# Patient Record
Sex: FEMALE | Race: OTHER | NOT HISPANIC OR LATINO
[De-identification: names, ages, dates, MRNs, and addresses within clinical notes are randomized per-mention and may not be internally consistent; named-entity substitution may affect disease eponyms.]

---

## 2019-05-16 PROBLEM — Z00.00 ENCOUNTER FOR PREVENTIVE HEALTH EXAMINATION: Status: ACTIVE | Noted: 2019-05-16

## 2019-05-30 ENCOUNTER — APPOINTMENT (OUTPATIENT)
Dept: ORTHOPEDIC SURGERY | Facility: CLINIC | Age: 49
End: 2019-05-30
Payer: MEDICAID

## 2019-05-30 ENCOUNTER — OUTPATIENT (OUTPATIENT)
Dept: OUTPATIENT SERVICES | Facility: HOSPITAL | Age: 49
LOS: 1 days | End: 2019-05-30
Payer: COMMERCIAL

## 2019-05-30 DIAGNOSIS — G56.22 LESION OF ULNAR NERVE, LEFT UPPER LIMB: ICD-10-CM

## 2019-05-30 DIAGNOSIS — G56.21 LESION OF ULNAR NERVE, RIGHT UPPER LIMB: ICD-10-CM

## 2019-05-30 PROCEDURE — 99203 OFFICE O/P NEW LOW 30 MIN: CPT

## 2019-05-30 PROCEDURE — 73130 X-RAY EXAM OF HAND: CPT | Mod: 26,50

## 2019-05-30 PROCEDURE — 73130 X-RAY EXAM OF HAND: CPT

## 2019-05-30 NOTE — PHYSICAL EXAM
[de-identified] : GEN: NAD, AOx3\par RUE: 5/5 FDI, , thumb opposition, palmar intrinsics, + tinels over carpal tunnel and cubital tunnel. Subluxing ulnar nerve at elbow. SILT throughout. + 2 radial pulse\par LUE: 5/5 FDI, thumb opposition, , intrinsics, + tinels over carpal tunnel and cubital tunnel.  No ulnar nerve subluxation. SILT throughout. + 2 radial pulse

## 2019-05-30 NOTE — HISTORY OF PRESENT ILLNESS
[FreeTextEntry1] : 48F with hx BL hand pain and numbness which has been waking her up at night and has been getting worse over the past few weeks. Exacerbated by typing and folding clothes. Denies weakness.

## 2019-06-10 ENCOUNTER — FORM ENCOUNTER (OUTPATIENT)
Age: 49
End: 2019-06-10

## 2019-06-11 ENCOUNTER — APPOINTMENT (OUTPATIENT)
Dept: ORTHOPEDIC SURGERY | Facility: CLINIC | Age: 49
End: 2019-06-11
Payer: MEDICAID

## 2019-06-11 ENCOUNTER — OUTPATIENT (OUTPATIENT)
Dept: OUTPATIENT SERVICES | Facility: HOSPITAL | Age: 49
LOS: 1 days | End: 2019-06-11
Payer: COMMERCIAL

## 2019-06-11 VITALS
OXYGEN SATURATION: 98 % | SYSTOLIC BLOOD PRESSURE: 120 MMHG | WEIGHT: 178 LBS | HEART RATE: 83 BPM | BODY MASS INDEX: 33.61 KG/M2 | DIASTOLIC BLOOD PRESSURE: 78 MMHG | HEIGHT: 61 IN

## 2019-06-11 DIAGNOSIS — Z78.9 OTHER SPECIFIED HEALTH STATUS: ICD-10-CM

## 2019-06-11 DIAGNOSIS — F41.9 ANXIETY DISORDER, UNSPECIFIED: ICD-10-CM

## 2019-06-11 DIAGNOSIS — F32.9 MAJOR DEPRESSIVE DISORDER, SINGLE EPISODE, UNSPECIFIED: ICD-10-CM

## 2019-06-11 DIAGNOSIS — Z82.61 FAMILY HISTORY OF ARTHRITIS: ICD-10-CM

## 2019-06-11 DIAGNOSIS — Z80.3 FAMILY HISTORY OF MALIGNANT NEOPLASM OF BREAST: ICD-10-CM

## 2019-06-11 PROCEDURE — 72083 X-RAY EXAM ENTIRE SPI 4/5 VW: CPT | Mod: 26

## 2019-06-11 PROCEDURE — 72100 X-RAY EXAM L-S SPINE 2/3 VWS: CPT

## 2019-06-11 PROCEDURE — 99215 OFFICE O/P EST HI 40 MIN: CPT

## 2019-06-11 PROCEDURE — 72082 X-RAY EXAM ENTIRE SPI 2/3 VW: CPT

## 2019-06-17 PROBLEM — Z82.61 FAMILY HISTORY OF ARTHRITIS: Status: ACTIVE | Noted: 2019-06-11

## 2019-06-17 PROBLEM — Z78.9 NEVER EXERCISES: Status: ACTIVE | Noted: 2019-06-11

## 2019-06-17 PROBLEM — F32.9 DEPRESSION: Status: ACTIVE | Noted: 2019-06-11

## 2019-06-17 PROBLEM — F41.9 ANXIETY: Status: ACTIVE | Noted: 2019-06-11

## 2019-06-17 PROBLEM — Z80.3 FAMILY HISTORY OF MALIGNANT NEOPLASM OF BREAST: Status: ACTIVE | Noted: 2019-06-11

## 2019-06-17 RX ORDER — FLUVOXAMINE MALEATE 100 MG/1
100 TABLET, FILM COATED ORAL
Refills: 0 | Status: ACTIVE | COMMUNITY

## 2019-06-30 ENCOUNTER — FORM ENCOUNTER (OUTPATIENT)
Age: 49
End: 2019-06-30

## 2019-06-30 NOTE — HISTORY OF PRESENT ILLNESS
[de-identified] : Ms. CARVAJAL is a very pleasant 48 year old female who complains of low back pain for 1 year, atraumatic. On initial presentation symptoms were as follows: Patient described the pain as intermittent.  Patient rated the pain as 6-9/10 severity.  The pain localized to lower back.  There is no radiation of pain. She does also report pain in both calves over the same time period. Has had a negative vascular work up per patient for calf pain. Has calf pain at rest and with ambulation. Patient  denies extremity numbness and paresthesias. The pain is relieved by lying down.  The pain is worsened by activity, heavy lifting, or prolonged standing. Past treatments included pharmacologic management, with mild temporary relief. The patient has not had physical therapy or steroid injections.\par \par The patient reports no loss of hand dexterity.\par The patient states there no loss of balance when walking.\par There no sensory loss in the arms or legs\par The patent no difficulty with urination.\par \par The patient no history of previous spine surgery.\par The patient no previous spine consultation.\par \par Pain:\par Back 50 Right Leg 25  Left Leg 25\par \par The patient has no history of unexpected weight loss, no history of active cancer, no history bladder or bowel dysfunction, no night pain, no fevers or chills.\par \par The past medical history, surgical history, family history, allergies, medications, 10+ point review of systems, family history and social history were reviewed and non contributory.\par \par

## 2019-06-30 NOTE — PHYSICAL EXAM
[de-identified] : General: patient is well developed, well nourished, in no acute \par distress, alert and oriented x 3. \par \par Mood and affect: normal\par \par Respiratory: no respiratory distress noted\par \par Skin: no scars over spine, skin intact, no erythema, increased warmth\par \par Alignment:The spine is well compensated in the coronal and sagittal plane.  There is no asymmetry on the luis forward bend test\par \par Gait: The patient is able to toe walk and heel walk without difficulty. The patient is able to tandem gait without difficulty.\par \par Palpation: no tenderness to palpation spine or paraspinal region\par \par Range of motion: Lumbar spine ROM is full\par \par Neurologic Exam:\par Motor: Manual Muscle testing in the lower extremities is 5 out of 5 in all muscle groups. There is no evidence of muscular atrophy in the lower extremities. Sensory: Sensation to light touch is grossly intact in the lower extremities\par \par Reflexes: DTR are present and symmetric throughout, negative tijerina bilaterally, negative inverted radial reflex bilaterally, no clonus, plantar responses are flexor\par \par Hip Exam: No pain with internal or external rotation of hips bilaterally\par \par Special tests: Straight leg raise test negative.  Cross straight leg test negative.  BLAINE test negative\par \par Vascular: Examination of the peripheral vascular system demonstrates no evidence of congestion or edema. no evidence of lymphedema bilateral lower extremities, pulses are present and symmetric in both lower extremities.\par \par  [de-identified] : XR thoracolumbar 6/11/19: multilevel degenerative changes, most pronounced at L2/3 and L3/4, no significant listhesis or dynamic instability, no significant scoliosis, no acute fractures

## 2019-06-30 NOTE — DISCUSSION/SUMMARY
[de-identified] : Discussed the results of the patient's history, physical exam, and imaging. Ms. Cui has been suffering from non radiating low back pain for the past year. She also reports bilateral calf pain over the same time period, no change with rest. She has had a negative vascular workup from an outside provider per the patient. Physical and neurologic exams are normal. I am recommending an MRI lumbar without contrast for further evaluation. The patient will follow up with me after imaging is completed, sooner if there is an issue.  All questions were answered.\par \par

## 2019-07-01 ENCOUNTER — APPOINTMENT (OUTPATIENT)
Dept: MRI IMAGING | Facility: HOSPITAL | Age: 49
End: 2019-07-01
Payer: MEDICAID

## 2019-07-01 ENCOUNTER — OUTPATIENT (OUTPATIENT)
Dept: OUTPATIENT SERVICES | Facility: HOSPITAL | Age: 49
LOS: 1 days | End: 2019-07-01
Payer: COMMERCIAL

## 2019-07-01 PROCEDURE — 72148 MRI LUMBAR SPINE W/O DYE: CPT | Mod: 26

## 2019-07-01 PROCEDURE — 72148 MRI LUMBAR SPINE W/O DYE: CPT

## 2019-07-10 ENCOUNTER — APPOINTMENT (OUTPATIENT)
Dept: ORTHOPEDIC SURGERY | Facility: CLINIC | Age: 49
End: 2019-07-10
Payer: MEDICAID

## 2019-07-10 PROCEDURE — 99215 OFFICE O/P EST HI 40 MIN: CPT

## 2019-07-10 NOTE — HISTORY OF PRESENT ILLNESS
[de-identified] : Follow Up 7/10/19: Patient continues to have moderate/severe non radiating low back pain. Also complains of bilateral calf pain. Low back pain more severe than leg pain. Symptoms unchanged. Denies new neurologic symptoms, denies bowel/bladder symptoms. Here to discuss MRI lumbar.\par \par Initial 6/11/19: Ms. CARVAJAL is a very pleasant 48 year old female who complains of low back pain for 1 year, atraumatic. On initial presentation symptoms were as follows: Patient described the pain as intermittent. Patient rated the pain as 6-9/10 severity. The pain localized to lower back. There is no radiation of pain. She does also report pain in both calves over the same time period. Has had a negative vascular work up per patient for calf pain. Has calf pain at rest and with ambulation. Patient denies extremity numbness and paresthesias. The pain is relieved by lying down. The pain is worsened by activity, heavy lifting, or prolonged standing. Past treatments included pharmacologic management, with mild temporary relief. The patient has not had physical therapy or steroid injections.\par \par The patient reports no loss of hand dexterity.\par The patient states there no loss of balance when walking.\par There no sensory loss in the arms or legs\par The patent no difficulty with urination.\par \par The patient no history of previous spine surgery.\par The patient no previous spine consultation.\par \par Pain:\par Back 50 Right Leg 25 Left Leg 25\par \par The patient has no history of unexpected weight loss, no history of active cancer, no history bladder or bowel dysfunction, no night pain, no fevers or chills.\par \par The past medical history, surgical history, family history, allergies, medications, 10+ point review of systems, family history and social history were reviewed and non contributory.

## 2019-07-10 NOTE — PHYSICAL EXAM
[de-identified] : General: patient is well developed, well nourished, in no acute \par distress, alert and oriented x 3. \par \par Mood and affect: normal\par \par Respiratory: no respiratory distress noted\par \par Skin: no scars over spine, skin intact, no erythema, increased warmth\par \par Alignment:The spine is well compensated in the coronal and sagittal plane. There is no asymmetry on the luis forward bend test\par \par Gait: The patient is able to toe walk and heel walk without difficulty. The patient is able to tandem gait without difficulty.\par \par Palpation: no tenderness to palpation spine or paraspinal region\par \par Range of motion: Lumbar spine ROM is full\par \par Neurologic Exam:\par Motor: Manual Muscle testing in the lower extremities is 5 out of 5 in all muscle groups. There is no evidence of muscular atrophy in the lower extremities. Sensory: Sensation to light touch is grossly intact in the lower extremities\par \par Reflexes: DTR are present and symmetric throughout, negative tijerina bilaterally, negative inverted radial reflex bilaterally, no clonus, plantar responses are flexor\par \par Hip Exam: No pain with internal or external rotation of hips bilaterally\par \par Special tests: Straight leg raise test negative. Cross straight leg test negative. BLIANE test negative\par \par Vascular: Examination of the peripheral vascular system demonstrates no evidence of congestion or edema. no evidence of lymphedema bilateral lower extremities, pulses are present and symmetric in both lower extremities.\par  [de-identified] : MRI lumbar 2019: L2/3 and L3/4 disc degeneration with minimum disc bulges; no significant stenosis\par \par XR thoracolumbar 6/11/19: multilevel degenerative changes, most pronounced at L2/3 and L3/4, no significant listhesis or dynamic instability, no significant scoliosis, no acute fractures.

## 2019-07-10 NOTE — DISCUSSION/SUMMARY
[de-identified] : Discussed the results of the patient's history, physical exam, and imaging. Ms. Cui has been suffering from non radiating low back pain for the past year. She also reports bilateral calf pain over the same time period, no change with rest. She has had a negative vascular workup from an outside provider per the patient. Physical and neurologic exams are normal. There is no indication for surgical intervention at this time. I am recommending a course of physical therapy, order given. I have also recommended a nonsteroidal anti-inflammatory, diclofenac, to be taken daily for 2 weeks.  If this is helpful I have instructed the patient to take it for an additional 2 weeks and then PRN afterwards.  A prescription for diclofenac was given.Patient to call office after course of physical therapy, sooner if there is an issue.  If no improvement in bilateral calf pain, will send for neurology evaluation. All questions answered.\par \par

## 2019-07-22 ENCOUNTER — RX RENEWAL (OUTPATIENT)
Age: 49
End: 2019-07-22

## 2019-08-21 ENCOUNTER — APPOINTMENT (OUTPATIENT)
Dept: NEUROLOGY | Facility: CLINIC | Age: 49
End: 2019-08-21
Payer: MEDICAID

## 2019-08-21 VITALS
HEIGHT: 61 IN | HEART RATE: 76 BPM | BODY MASS INDEX: 33.61 KG/M2 | SYSTOLIC BLOOD PRESSURE: 114 MMHG | TEMPERATURE: 98.4 F | WEIGHT: 178 LBS | OXYGEN SATURATION: 96 % | DIASTOLIC BLOOD PRESSURE: 76 MMHG

## 2019-08-21 PROCEDURE — 76882 US LMTD JT/FCL EVL NVASC XTR: CPT | Mod: LT

## 2019-08-21 PROCEDURE — 95885 MUSC TST DONE W/NERV TST LIM: CPT | Mod: 59

## 2019-08-21 PROCEDURE — 95913 NRV CNDJ TEST 13/> STUDIES: CPT

## 2019-08-21 PROCEDURE — 99204 OFFICE O/P NEW MOD 45 MIN: CPT

## 2019-08-21 NOTE — HISTORY OF PRESENT ILLNESS
[FreeTextEntry1] : CC: hand pain (new) \par \par Other Problem(s): low back pain; tingling, numbness in hands (new)\par \par HPI: 47 yo W referred by Dr. Castro for hand pain, numbness, tingling\par \par Location: hands and wrists, bilateral \par Quality: tingling, sharp \par Severity: 5-6/10\par Duration: 1 year\par Timing: worse at night\par Modifying Factors: better with brace \par Associated signs and symptoms: numbness and tingling in hands\par \par Data reviewed:\par Labs: none\par Imaging (reports): MRI L spine, x-ray T and L spine \par Imaging (independently reviewed): MRI L spine \par Prior records: notes from Dr. Segura and Dr. Castro - low back pain, surgery not recommended; likely carpal tunnel syndrome \par \par ROS: 13 pt review of systems performed and reviewed with patient (General, Eyes, Ears, Cardiovascular, Respiratory, Gastrointestinal, Genitourinary, Musculoskeletal, Skin, Endocrine, Hematologic, Psychiatric, Neurologic)\par Past medical history, surgical history, social history, and family history reviewed with patient\par See scanned document for details\par

## 2019-08-21 NOTE — CONSULT LETTER
[Dear  ___] : Dear  [unfilled], [Consult Letter:] : I had the pleasure of evaluating your patient, [unfilled]. [Consult Closing:] : Thank you very much for allowing me to participate in the care of this patient.  If you have any questions, please do not hesitate to contact me. [Please see my note below.] : Please see my note below. [Sincerely,] : Sincerely, [FreeTextEntry3] : Ian Rascon M.D.\par Neurology, Electromyography and Neuromuscular Medicine\par [facility]\par \par  of Neurology\par [facility] [employer]

## 2019-08-21 NOTE — PROCEDURE
[FreeTextEntry1] : Nerve Conduction, Electromyography and Neuromuscular Ultrasound Report [FreeTextEntry3] : Electro Physiologic Findings:\par \par Limb temperature was monitored and maintained at approximately 32 – 36° C in the upper extremities.\par \par The median sensory amplitudes were reduced bilaterally; the sensory and mixed nerve velocities were slow across the wrists compared to the distal sensory short segments bilaterally, worse on the right. The right median distal motor latency was prolonged, without conduction block across the wrist; the left median motor velocity was mildly reduced in the forearm. Otherwise the median motor responses were normal bilaterally and symmetric. \par \par The ulnar sensory responses were normal bilaterally and symmetric. There was a suggestion of mild slowing of ulnar motor responses across the elbows bilaterally, without conduction block; otherwise the sensory motor responses were normal and symmetric. \par \par The lumbrical studies were positive bilaterally, worse on the right. \par \par Needle electromyography was performed on the bilateral abductor pollicis brevis muscles. There was moderate active denervation on the right, and mild chronic denervation on the left. \par \par Neuromuscular Ultrasound was performed on the bilateral median nerves, using an [employer] Gamma with a linear high-frequency (12-19Hz) transducer probe. \par \par Clinical Electrophysiological Impression: \par \par This electrodiagnostic study demonstrated evidence of median nerve entrapments at both wrists. The entrapment was moderate in severity, with sensorimotor slowing, sensory axon loss, and active distal muscle denervation. The entrapment on the left was mild, with sensory slowing, sensory axon loss, and mild chronic muscle denervation. The median nerves were focally enlarged at the wrists on ultrasound bilaterally. These findings, along with the patient’s history and exam, are consistent with bilateral carpal tunnel syndrome, worse on the right. \par \par There was no definite evidence of ulnar entrapment or polyneuropathy on this upper extremity study.

## 2019-08-21 NOTE — PHYSICAL EXAM
[FreeTextEntry1] : Gen: appears well, well-nourished, no acute distress\par \par MS: awake, alert, speech fluent, comprehension intact, follows commands\par \par CN: PERRL, EOMI, visual fields full, facial strength and sensation intact and symmetric, palate elevation symmetric, tongue midline, no tongue atrophy or fasciculations\par \par Motor: normal bulk and tone, APB 4+ b/l otherwise 5/5 strength throughout, no abnormal movements\par \par Sensory: light touch intact and symmetric throughout\par \par Reflexes: 2+ symmetric throughout, no Harris’s sign, plantar responses flexor bilaterally\par \par Coordination: no dysmetria on finger to nose, Romberg negative\par \par Gait: normal, can tandem without difficulty\par \par Skin: no rash on extremities\par \par Ext: no edema\par \par CV: 2+ pulses b/l\par \par Ophtho: fundi not visualized

## 2019-08-21 NOTE — ASSESSMENT
[FreeTextEntry1] : NCS/EMG performed today demonstrated b/l median nerve entrapments worse on the right\par f/u with Dr. Castro for carpal tunnel injection vs. release\par back pain f/u Dr. Segura \par \par See separate procedure note for full results of study.

## 2019-08-21 NOTE — CONSULT LETTER
[Dear  ___] : Dear  [unfilled], [Consult Letter:] : I had the pleasure of evaluating your patient, [unfilled]. [Consult Closing:] : Thank you very much for allowing me to participate in the care of this patient.  If you have any questions, please do not hesitate to contact me. [Please see my note below.] : Please see my note below. [Sincerely,] : Sincerely, [FreeTextEntry3] : Ian Rascon M.D.\par Neurology, Electromyography and Neuromuscular Medicine\par [facility]\par \par  of Neurology\par [facility] [employer]

## 2019-08-21 NOTE — PROCEDURE
[FreeTextEntry1] : Nerve Conduction, Electromyography and Neuromuscular Ultrasound Report [FreeTextEntry3] : Electro Physiologic Findings:\par \par Limb temperature was monitored and maintained at approximately 32 – 36° C in the upper extremities.\par \par The median sensory amplitudes were reduced bilaterally; the sensory and mixed nerve velocities were slow across the wrists compared to the distal sensory short segments bilaterally, worse on the right. The right median distal motor latency was prolonged, without conduction block across the wrist; the left median motor velocity was mildly reduced in the forearm. Otherwise the median motor responses were normal bilaterally and symmetric. \par \par The ulnar sensory responses were normal bilaterally and symmetric. There was a suggestion of mild slowing of ulnar motor responses across the elbows bilaterally, without conduction block; otherwise the sensory motor responses were normal and symmetric. \par \par The lumbrical studies were positive bilaterally, worse on the right. \par \par Needle electromyography was performed on the bilateral abductor pollicis brevis muscles. There was moderate active denervation on the right, and mild chronic denervation on the left. \par \par Neuromuscular Ultrasound was performed on the bilateral median nerves, using an [employer] Gamma with a linear high-frequency (12-19Hz) transducer probe. \par \par Clinical Electrophysiological Impression: \par \par This electrodiagnostic study demonstrated evidence of median nerve entrapments at both wrists. The entrapment was moderate in severity, with sensorimotor slowing, sensory axon loss, and active distal muscle denervation. The entrapment on the left was mild, with sensory slowing, sensory axon loss, and mild chronic muscle denervation. The median nerves were focally enlarged at the wrists on ultrasound bilaterally. These findings, along with the patient’s history and exam, are consistent with bilateral carpal tunnel syndrome, worse on the right. \par \par There was no definite evidence of ulnar entrapment or polyneuropathy on this upper extremity study.

## 2019-09-05 ENCOUNTER — APPOINTMENT (OUTPATIENT)
Dept: ORTHOPEDIC SURGERY | Facility: CLINIC | Age: 49
End: 2019-09-05
Payer: MEDICAID

## 2019-09-05 PROCEDURE — 99213 OFFICE O/P EST LOW 20 MIN: CPT

## 2019-09-05 NOTE — HISTORY OF PRESENT ILLNESS
[de-identified] : 49 F presenting for follow up visit who has B/L CTS R>L. last visit pt told to wear splints and get an out patient EMG which she did. Results showed b/l median nerve entrapment R>L. pt states she continues to have pain and it has progressed from last visit. reports now she has been dropping items from her right hand. Numbness and tingling continues to be intermittent. Otherwise interval history is the same.

## 2019-09-05 NOTE — PHYSICAL EXAM
[de-identified] : Physical Exam: \par general: well appearing female\par RUE: \par no swelling or effusion. no erythema. no wounds, lacerations or abrasions. \par no TTP over palm. \par + tinnels and phalen's tests\par thenar atrophy noted when compared to Left side\par hand  5/5\par SILT over m/u/r nerves\par 2+ radial pulse\par \par LUE: \par no swelling or effusion. no erythema. no wounds, lacerations or abrasions. \par no TTP over palm. \par + tinnels and phalen's tests\par no thenar atrophy noted\par hand  5/5\par SILT over m/u/r nerves\par 2+ radial pulse

## 2019-09-05 NOTE — ASSESSMENT
[FreeTextEntry1] : 49F w b/l carpal tunnel R>L discussed surgery today. Pt will be schedule for a R open carpal tunnel release at a date of her choosing.

## 2019-10-23 ENCOUNTER — APPOINTMENT (OUTPATIENT)
Dept: ORTHOPEDIC SURGERY | Facility: CLINIC | Age: 49
End: 2019-10-23
Payer: MEDICAID

## 2019-10-23 DIAGNOSIS — M54.5 LOW BACK PAIN: ICD-10-CM

## 2019-10-23 DIAGNOSIS — G89.29 LOW BACK PAIN: ICD-10-CM

## 2019-10-23 PROCEDURE — 99213 OFFICE O/P EST LOW 20 MIN: CPT

## 2019-10-29 ENCOUNTER — APPOINTMENT (OUTPATIENT)
Dept: ORTHOPEDIC SURGERY | Facility: CLINIC | Age: 49
End: 2019-10-29

## 2019-11-03 ENCOUNTER — FORM ENCOUNTER (OUTPATIENT)
Age: 49
End: 2019-11-03

## 2019-11-04 ENCOUNTER — APPOINTMENT (OUTPATIENT)
Dept: ORTHOPEDIC SURGERY | Facility: CLINIC | Age: 49
End: 2019-11-04
Payer: MEDICAID

## 2019-11-04 ENCOUNTER — OUTPATIENT (OUTPATIENT)
Dept: OUTPATIENT SERVICES | Facility: HOSPITAL | Age: 49
LOS: 1 days | End: 2019-11-04
Payer: COMMERCIAL

## 2019-11-04 VITALS — WEIGHT: 180 LBS | HEART RATE: 64 BPM | HEIGHT: 61 IN | OXYGEN SATURATION: 98 % | BODY MASS INDEX: 33.99 KG/M2

## 2019-11-04 DIAGNOSIS — M76.51 PATELLAR TENDINITIS, RIGHT KNEE: ICD-10-CM

## 2019-11-04 PROCEDURE — 99213 OFFICE O/P EST LOW 20 MIN: CPT

## 2019-11-04 PROCEDURE — 73564 X-RAY EXAM KNEE 4 OR MORE: CPT | Mod: 26,50

## 2019-11-04 PROCEDURE — 73564 X-RAY EXAM KNEE 4 OR MORE: CPT

## 2019-11-04 RX ORDER — DICLOFENAC SODIUM 75 MG/1
75 TABLET, DELAYED RELEASE ORAL
Qty: 30 | Refills: 0 | Status: DISCONTINUED | COMMUNITY
Start: 2019-07-22 | End: 2019-11-04

## 2019-11-04 RX ORDER — DICLOFENAC SODIUM 75 MG/1
75 TABLET, DELAYED RELEASE ORAL
Qty: 30 | Refills: 0 | Status: DISCONTINUED | COMMUNITY
Start: 2019-07-10 | End: 2019-11-04

## 2019-11-04 RX ORDER — DICLOFENAC SODIUM 75 MG/1
75 TABLET, DELAYED RELEASE ORAL
Qty: 30 | Refills: 0 | Status: DISCONTINUED | COMMUNITY
Start: 2019-10-23 | End: 2019-11-04

## 2019-11-04 NOTE — HISTORY OF PRESENT ILLNESS
[de-identified] : The patient is a 49 year old woman with history of lumbar degenerative disc disease who presents with bilateral knee pain.\par \par The patient presents with a three month history of bilateral, anterior knee pain, right worse than left.  She denies precipitating injury or trauma.  She states that the pain is worse when going up and down stairs.  She denies swelling or bruising.  She endorses occasional clicking sensations.  She denies mechanical symptoms including catching, locking, buckling. \par \par  [7] : a current pain level of 7/10

## 2019-11-04 NOTE — PHYSICAL EXAM
[de-identified] : General: Well-nourished, well-developed, alert, and in no acute distress.\par Head: Normocephalic.\par Eyes: Pupils equal round reactive to light and accommodation, extraocular muscles intact, normal sclera.\par Nose: No nasal discharge.\par Cardiac: Regular rate. Extremities are warm and well perfused. Distal pulses are symmetric bilaterally.\par Respiratory: No labored breathing.\par Extremities: Sensation is intact distally bilaterally.  Distal pulses are symmetric bilaterally\par Neurologic: No focal deficits\par Skin: Normal skin color, texture, and turgor\par Psychiatric: Normal affect\par \par RIGHT KNEE:\par \par Inspection: no bruising, swelling, erythema\par Joint Effusion:no \par ROM: Knee Flexion 130-140 , Knee Extension 0\par Palpation:MILD MEDIAL JOINT LINE PAIN, MILD TENDERNESS AT MID-POLE PATELLA TENDON, MODERATE PATELLAE FACET TENDERNESS, NO PAIN AT MFC/LFC, Medial/Lateral Tibial Plateau\par Leg Length Discrepancy:no\par Patella: no apprehension\par Distal Pulses: normal\par Lower Extremity Strength:normal, 5/5 \par Lower Extremity Reflexes:normal, 2+\par Lower Extremity Sensation: normal\par \par Special Tests:\par Henrik:Negative \par Anterior Drawer:Negative\par Anterior Lachman:Negative\par Posterior Drawer:Negative \par Varus/Valgus:Negative, no instability\par \par LEFT KNEE:\par \par Inspection: no bruising, swelling, erythema\par Joint Effusion:no \par ROM: Knee Flexion 130-140 , Knee Extension 0\par Palpation:MILD PATELLAR FACET TENDERNESS, No pain at joint line, patellar tendon, MFC/LFC, Medial/Lateral Tibial Plateau\par Leg Length Discrepancy:no\par Patella: no apprehension\par Distal Pulses: normal\par Lower Extremity Strength:normal, 5/5 \par Lower Extremity Reflexes:normal, 2+\par Lower Extremity Sensation: normal\par \par Special Tests:\par Henrik:Negative \par Anterior Drawer:Negative\par Anterior Lachman:Negative\par Posterior Drawer:Negative \par Varus/Valgus:Negative, no instability\par \par \par \par  [de-identified] : Xray Bilateral Knees - Multiple views were reviewed with the patient in detail.  There is no acute fracture or dislocation.  There is no joint effusion.  We will await formal radiology reading.\par \par \par

## 2019-11-04 NOTE — DISCUSSION/SUMMARY
[Medication Risks Reviewed] : Medication risks reviewed [de-identified] : The patient is a 49 year old woman who presents with a three month history of bilateral, anterior knee pain, right worse than left.  Her pain is likely secondary to bilateral patellofemoral pain syndrome and right patellar tendinopathy.\par \par Imaging was reviewed with the patient in detail.  All questions were answered appropriately.\par \par Patient was prescribed a course of physical therapy today.  The patient was also provided some general home exercises.  The patient was counseled on activity modification.\par \par The patient was prescribed a short course of Diclofenac.  Appropriate use of medication was reviewed with the patient in detail. Risks, benefits, and adverse effects medication were discussed.\par \par Follow-up in 4-6 weeks.\par \par Patient appreciates and agrees with current plan.\par \par This note was generated using dragon medical dictation software.  A reasonable effort has been made for proofreading its contents, but typos may still remain.  If there are any questions or points of clarification needed please notify my office.\par \par

## 2019-12-16 ENCOUNTER — APPOINTMENT (OUTPATIENT)
Dept: ORTHOPEDIC SURGERY | Facility: CLINIC | Age: 49
End: 2019-12-16
Payer: MEDICAID

## 2019-12-16 VITALS
SYSTOLIC BLOOD PRESSURE: 120 MMHG | HEART RATE: 66 BPM | BODY MASS INDEX: 33.99 KG/M2 | HEIGHT: 61 IN | WEIGHT: 180 LBS | DIASTOLIC BLOOD PRESSURE: 66 MMHG | OXYGEN SATURATION: 98 %

## 2019-12-16 DIAGNOSIS — M25.561 PAIN IN RIGHT KNEE: ICD-10-CM

## 2019-12-16 PROCEDURE — 99213 OFFICE O/P EST LOW 20 MIN: CPT

## 2019-12-16 RX ORDER — DICLOFENAC SODIUM 75 MG/1
75 TABLET, DELAYED RELEASE ORAL
Qty: 30 | Refills: 1 | Status: DISCONTINUED | COMMUNITY
Start: 2019-11-04 | End: 2019-12-16

## 2019-12-16 NOTE — HISTORY OF PRESENT ILLNESS
[de-identified] : The patient is a 49 year old woman with history of lumbar degenerative disc disease who presents for follow-up for bilateral knee pain.\par \par The patient was last seen about 6 weeks ago on 11/4/2019 for a three month history of atraumatic, bilateral, anterior knee pain.  Her pain was attributed to bilateral patellofemoral pain syndrome and right patellar tendinopathy.  She was prescribed a short course of Diclofenac and instructed to start physical therapy.  She states that her pain has improved with physical therapy.  She has been using Naproxen instead of Diclofenac, which was prescribed by another physician.  The patient denies significant swelling or bruising.\par She now feels occasional catching sensations.  Her pain is mostly with prolonged standing or transition form sit to stand.\par \par She has no focal back pain today. [5] : a current pain level of 5/10

## 2019-12-16 NOTE — DISCUSSION/SUMMARY
[Medication Risks Reviewed] : Medication risks reviewed [de-identified] : The patient is a 49 year old woman with history of degenerative disc disease who presents with a several month history of bilateral, anterior knee pain, right worse than left.  She has a working diagnosis of bilateral patellofemoral syndrome.  She has had symptom improvement with physical therapy, but continues to have pain, and her history is somewhat atypical for her degree of pain.  There is question is pain is related to lumbar spine.  \par \par MRI of the right knee ordered today to rule out chondromalacia patella, OA, OCD, medial meniscal tear.\par \par Stop Diclofenac.  She was given a refill for Naproxen to be used on an as needed basis.  Appropriate use of medication was reviewed with the patient in detail. Risks, benefits, and adverse effects medication were discussed.\par \par She was provided with a new PT script today.\par \par Follow-up after MRI.\par \par Patient appreciates and agrees with current plan.\par \par This note was generated using dragon medical dictation software.  A reasonable effort has been made for proofreading its contents, but typos may still remain.  If there are any questions or points of clarification needed please notify my office.\par \par

## 2019-12-16 NOTE — PHYSICAL EXAM
[de-identified] : General: Well-nourished, well-developed, alert, and in no acute distress.\par Head: Normocephalic.\par Eyes: Pupils equal round reactive to light and accommodation, extraocular muscles intact, normal sclera.\par Nose: No nasal discharge.\par Cardiac: Regular rate. Extremities are warm and well perfused. Distal pulses are symmetric bilaterally.\par Respiratory: No labored breathing.\par Extremities: Sensation is intact distally bilaterally.  Distal pulses are symmetric bilaterally\par Neurologic: No focal deficits\par Skin: Normal skin color, texture, and turgor\par Psychiatric: Normal affect\par \par RIGHT KNEE:\par \par Inspection: no bruising, swelling, erythema\par Joint Effusion:no \par ROM: Knee Flexion 130-140 , Knee Extension 0\par Palpation:MILD MEDIAL JOINT LINE PAIN, MILD TENDERNESS AT MID-POLE PATELLA TENDON, MODERATE PATELLAE FACET TENDERNESS, NO PAIN AT MFC/LFC, Medial/Lateral Tibial Plateau\par Leg Length Discrepancy:no\par Patella: no apprehension\par Distal Pulses: normal\par Lower Extremity Strength:normal, 5/5 \par Lower Extremity Reflexes:normal, 2+\par Lower Extremity Sensation: normal\par \par Special Tests:\par Henrik:Negative \par Anterior Drawer:Negative\par Anterior Lachman:Negative\par Posterior Drawer:Negative \par Varus/Valgus:Negative, no instability\par \par LEFT KNEE:\par \par Inspection: no bruising, swelling, erythema\par Joint Effusion:no \par ROM: Knee Flexion 130-140 , Knee Extension 0\par Palpation:MILD PATELLAR FACET TENDERNESS, No pain at joint line, patellar tendon, MFC/LFC, Medial/Lateral Tibial Plateau\par Leg Length Discrepancy:no\par Patella: no apprehension\par Distal Pulses: normal\par Lower Extremity Strength:normal, 5/5 \par Lower Extremity Reflexes:normal, 2+\par Lower Extremity Sensation: normal\par \par Special Tests:\par Henrik:Negative \par Anterior Drawer:Negative\par Anterior Lachman:Negative\par Posterior Drawer:Negative \par Varus/Valgus:Negative, no instability\par \par \par \par

## 2020-01-06 PROBLEM — M54.5 CHRONIC BILATERAL LOW BACK PAIN WITHOUT SCIATICA: Status: ACTIVE | Noted: 2019-06-11

## 2020-01-07 NOTE — HISTORY OF PRESENT ILLNESS
[de-identified] : Follow Up 10/23/19: Patient continues to have moderate/severe non radiating low back pain. Also complains of bilateral calf pain. Low back pain more severe than leg pain. Symptoms unchanged. Denies new neurologic symptoms. \par \par Follow Up 7/10/19: Patient continues to have moderate/severe non radiating low back pain. Also complains of bilateral calf pain. Low back pain more severe than leg pain. Symptoms unchanged. Denies new neurologic symptoms, denies bowel/bladder symptoms. Here to discuss MRI lumbar.\par \par Initial 6/11/19: Ms. CARVAJAL is a very pleasant 48 year old female who complains of low back pain for 1 year, atraumatic. On initial presentation symptoms were as follows: Patient described the pain as intermittent. Patient rated the pain as 6-9/10 severity. The pain localized to lower back. There is no radiation of pain. She does also report pain in both calves over the same time period. Has had a negative vascular work up per patient for calf pain. Has calf pain at rest and with ambulation. Patient denies extremity numbness and paresthesias. The pain is relieved by lying down. The pain is worsened by activity, heavy lifting, or prolonged standing. Past treatments included pharmacologic management, with mild temporary relief. The patient has not had physical therapy or steroid injections.\par \par The patient reports no loss of hand dexterity.\par The patient states there no loss of balance when walking.\par There no sensory loss in the arms or legs\par The patent no difficulty with urination.\par \par The patient no history of previous spine surgery.\par The patient no previous spine consultation.\par \par Pain:\par Back 50 Right Leg 25 Left Leg 25\par \par The patient has no history of unexpected weight loss, no history of active cancer, no history bladder or bowel dysfunction, no night pain, no fevers or chills.\par \par The past medical history, surgical history, family history, allergies, medications, 10+ point review of systems, family history and social history were reviewed and non contributory. \par

## 2020-01-07 NOTE — DISCUSSION/SUMMARY
[de-identified] : Discussed the results of the patient's history, physical exam, and imaging.  The patient's back pain is likely multifactorial, stemming from degenerative disc disease and facet hypertrophy.  There is no surgical indication for intervention at this time.  We discussed the importance of improving core strength and flexibility, and a prescription for physical therapy was given. Has had negative vascular and neurologic work up for calf pain. Has appointment for knee evaluation. The patient will follow up with me as needed.  All questions were answered.\par \par

## 2020-01-07 NOTE — PHYSICAL EXAM
[de-identified] : General: patient is well developed, well nourished, in no acute \par distress, alert and oriented x 3. \par \par Mood and affect: normal\par \par Respiratory: no respiratory distress noted\par \par Skin: no scars over spine, skin intact, no erythema, increased warmth\par \par Alignment:The spine is well compensated in the coronal and sagittal plane.  There is no asymmetry on the luis forward bend test\par \par Gait: The patient is able to toe walk and heel walk without difficulty. The patient is able to tandem gait without difficulty.\par \par Palpation: no tenderness to palpation spine or paraspinal region\par \par Range of motion: Lumbar spine ROM is full\par \par Neurologic Exam:\par Motor: Manual Muscle testing in the lower extremities is 5 out of 5 in all muscle groups. There is no evidence of muscular atrophy in the lower extremities. Sensory: Sensation to light touch is grossly intact in the lower extremities\par \par Reflexes: DTR are present and symmetric throughout, negative tijerina bilaterally, negative inverted radial reflex bilaterally, no clonus, plantar responses are flexor\par \par Hip Exam: No pain with internal or external rotation of hips bilaterally\par \par Special tests: Straight leg raise test negative.  Cross straight leg test negative.  BLAINE test negative\par \par Vascular: Examination of the peripheral vascular system demonstrates no evidence of congestion or edema. no evidence of lymphedema bilateral lower extremities, pulses are present and symmetric in both lower extremities.\par \par  [de-identified] : MRI lumbar 2019: L2/3 and L3/4 disc degeneration with minimum disc bulges; no significant stenosis\par \par XR thoracolumbar 6/11/19: multilevel degenerative changes, most pronounced at L2/3 and L3/4, no significant listhesis or dynamic instability, no significant scoliosis, no acute fractures

## 2020-01-09 ENCOUNTER — APPOINTMENT (OUTPATIENT)
Dept: ORTHOPEDIC SURGERY | Facility: CLINIC | Age: 50
End: 2020-01-09
Payer: MEDICAID

## 2020-01-09 VITALS
BODY MASS INDEX: 33.99 KG/M2 | HEIGHT: 61 IN | OXYGEN SATURATION: 98 % | SYSTOLIC BLOOD PRESSURE: 112 MMHG | WEIGHT: 180 LBS | HEART RATE: 61 BPM | DIASTOLIC BLOOD PRESSURE: 70 MMHG

## 2020-01-09 DIAGNOSIS — M23.203 DERANGEMENT OF UNSPECIFIED MEDIAL MENISCUS DUE TO OLD TEAR OR INJURY, RIGHT KNEE: ICD-10-CM

## 2020-01-09 PROCEDURE — 99214 OFFICE O/P EST MOD 30 MIN: CPT

## 2020-01-09 NOTE — PHYSICAL EXAM
[de-identified] : MRI of the Right Knee - Multiple views were reviewed with the patient in detail.  \par \par Vertical tear of the medial meniscus posterior horn/posterior root junction.  No displaced meniscal flap fragment or bucket handle tear.  Mild insetional semimembranosus tendiosis.  Tricompartmental arthrosis, most pronounced in the patellofemoral compartment with moderate patellar cartilage wear.  Mild chondral thinning in the medial compartment.  Small area of mild cartilage wear along the lateral tibial plateau surface.  Large effusion/synovitis.  Complex, septated popliteal cyst.\par  [de-identified] : General: Well-nourished, well-developed, alert, and in no acute distress.\par Head: Normocephalic.\par Eyes: Pupils equal round reactive to light and accommodation, extraocular muscles intact, normal sclera.\par Nose: No nasal discharge.\par Cardiac: Regular rate. Extremities are warm and well perfused. Distal pulses are symmetric bilaterally.\par Respiratory: No labored breathing.\par Extremities: Sensation is intact distally bilaterally.  Distal pulses are symmetric bilaterally, 1+ RIGHT LOWER EXTREMITY EDEMA, PITTING\par Neurologic: No focal deficits\par Skin: Normal skin color, texture, and turgor\par Psychiatric: Normal affect\par \par RIGHT KNEE:\par \par Inspection: no bruising, swelling, erythema\par Joint Effusion:MINIMAL\par ROM: Knee Flexion 130 WITH PAIN AT END FLEXION , Knee Extension 0\par Palpation:MILD MEDIAL JOINT LINE PAIN, MODERATE PATELLAE FACET TENDERNESS, NO PAIN AT MFC/LFC, Medial/Lateral Tibial Plateau\par Leg Length Discrepancy:no\par Patella: no apprehension\par Distal Pulses: normal\par Lower Extremity Strength:KNEE EXTENSION 4+/5, KNEE FLEXION 4+/5 2/2 PAIN\par Lower Extremity Reflexes:normal, 2+\par Lower Extremity Sensation: normal\par \par Special Tests:\par Henrik:POSITIVE MEDIALLY \par Anterior Drawer:Negative\par Anterior Lachman:Negative\par Posterior Drawer:Negative \par Varus/Valgus:Negative, no instability\par \par LEFT KNEE:\par \par Inspection: no bruising, swelling, erythema\par Joint Effusion:no \par ROM: Knee Flexion 130-140 , Knee Extension 0\par Palpation:MILD PATELLAR FACET TENDERNESS, No pain at joint line, patellar tendon, MFC/LFC, Medial/Lateral Tibial Plateau\par Leg Length Discrepancy:no\par Patella: no apprehension\par Distal Pulses: normal\par Lower Extremity Strength:normal, 5/5 \par Lower Extremity Reflexes:normal, 2+\par Lower Extremity Sensation: normal\par \par Special Tests:\par Henrik:Negative \par Anterior Drawer:Negative\par Anterior Lachman:Negative\par Posterior Drawer:Negative \par Varus/Valgus:Negative, no instability\par \par [] ANKLE:\par \par Inspection: no ecchymosis, gross deformity, 1+ LOWER EXTREMITY EDEMA\par Palpation: NO pain at ATFL, CFL,deltoid ligament, joint line pain, lateral malleolus pain, medial malleolus pain, achilles pain,  fibular head pain, 5th metatarsal pain, tarsal or distal phalanx pain\par ROM: normal ankle dorsiflexion, ankle plantarflexion, eversion, inversion\par Strength: 5/5\par Neurovascular: 2+ pulses distally\par Sensation: normal\par \par Special Tests: \par Anterior Drawer: Negative\par Talar Tilt: Negative\par External Rotation at Tib-Fib Syndesmosis: MILDLY POSITIVE\par Murillo Test: Negative\par Gait:FAVORS RIGHT SIDE\par PREM'S TEST: POSITIVE\par \par \par

## 2020-01-09 NOTE — HISTORY OF PRESENT ILLNESS
[de-identified] : The patient is a 49 year old woman who presents for follow-up for right knee pain and new right ankle swelling/calf pain.\par \par The patient presents for three week follow-up.  She was last seen on 12/16/2019 for chronic, atraumatic, bilateral knee pain, right worse than left.  Her pain was initially thought to be secondary to patellofemoral pain syndrome.  She tried a course of physical therapy and NSAIDs, but her pain persisted.  She subsequently had an MRI of the right knee which showed:\par \par Vertical tear of the medial meniscus posterior horn/posterior root junction.  No displaced meniscal flap fragment or bucket handle tear.  Mild insetional semimembranosus tendiosis.  Tricompartmental arthrosis, most pronounced in the patellofemoral compartment with moderate patellar cartilage wear.  Mild chondral thinning in the medial compartment.  Small area of mild cartilage wear along the lateral tibial plateau surface.  Large effusion/synovitis.  Complex, septated popliteal cyst.\par \par She states that her pain is improved with physical therapy and naproxen, but she still has some residual pain and swelling.\par \par She also complains of acute on chronic right calf pain which began in the fall of 2019.  The patient denies precipitating injury or trauma.  She denies inversion/eversion injury of the ankle.  She has some localized swelling around the ankle.  She saw her PMD and he ordered an MRI of the right ankle.  She denies prolonged immobilization or recent travel.  She is on estrogen chronically.  She denies constitutional symptoms including fever, chills.

## 2020-01-09 NOTE — DISCUSSION/SUMMARY
[Medication Risks Reviewed] : Medication risks reviewed [de-identified] : The patient is a 49 year old woman with history of degenerative disc disease who presents with chronic right knee pain secondary to knee osteoarthritis with likely degenerative, vertical medial meniscus tear.   \par \par She also complains of calf pain and has a positive Juhi's sign.  Cannot rule out DVT.  Overall, her ankle exam is not overly impressive.  MRI of the right ankle pending.\par \par Imaging was reviewed with the patient in detail.  All questions were answered appropriately.\par \par Regarding her knee, we discussed treatment options in detail including observation, repeat physical therapy, Naproxen as needed, injections, surgery.\par \par Patient was prescribed a course of physical therapy today.   The patient was counseled on activity modification.\par \par Euflexxa injections ordered today.\par \par The patient is considering cortisone injection in the future.\par \par Regarding her right ankle/calf, an Lower Extremity doppler was ordered today to rule out DVT.  She was given a NATALIE compression stocking today.\par \par We will discuss Doppler results.\par \par Otherwise, follow-up in 4 weeks regarding knee pain.\par \par Patient appreciates and agrees with current plan.\par \par This note was generated using dragon medical dictation software.  A reasonable effort has been made for proofreading its contents, but typos may still remain.  If there are any questions or points of clarification needed please notify my office.\par \par \par \par

## 2020-02-06 ENCOUNTER — APPOINTMENT (OUTPATIENT)
Dept: ORTHOPEDIC SURGERY | Facility: CLINIC | Age: 50
End: 2020-02-06
Payer: MEDICAID

## 2020-02-10 ENCOUNTER — APPOINTMENT (OUTPATIENT)
Dept: ORTHOPEDIC SURGERY | Facility: CLINIC | Age: 50
End: 2020-02-10
Payer: MEDICAID

## 2020-02-10 PROCEDURE — 99213 OFFICE O/P EST LOW 20 MIN: CPT

## 2020-02-10 NOTE — PHYSICAL EXAM
[de-identified] : General: Well-nourished, well-developed, alert, and in no acute distress.\par Head: Normocephalic.\par Eyes: Pupils equal round reactive to light and accommodation, extraocular muscles intact, normal sclera.\par Nose: No nasal discharge.\par Cardiac: Regular rate. Extremities are warm and well perfused. Distal pulses are symmetric bilaterally.\par Respiratory: No labored breathing.\par Extremities: Sensation is intact distally bilaterally.  Distal pulses are symmetric bilaterally, 1+ RIGHT LOWER EXTREMITY EDEMA, PITTING\par Neurologic: No focal deficits\par Skin: Normal skin color, texture, and turgor\par Psychiatric: Normal affect\par \par RIGHT KNEE:\par \par Inspection: no bruising, swelling, erythema\par Joint Effusion:MINIMAL\par ROM: Knee Flexion 130 DEGREES , Knee Extension 0\par Palpation:MILD MEDIAL JOINT LINE PAIN, MODERATE PATELLAE FACET TENDERNESS, NO PAIN AT MFC/LFC, Medial/Lateral Tibial Plateau\par Leg Length Discrepancy:no\par Patella: no apprehension\par Distal Pulses: normal\par Lower Extremity Strength:KNEE EXTENSION 5/5, KNEE FLEXION 5/5 \par Lower Extremity Reflexes:normal, 2+\par Lower Extremity Sensation: normal\par \par Special Tests:\par Henrik:Negative\par Anterior Drawer:Negative\par Anterior Lachman:Negative\par Posterior Drawer:Negative \par Varus/Valgus:Negative, no instability\par \par LEFT KNEE:\par \par Inspection: no bruising, swelling, erythema\par Joint Effusion:no \par ROM: Knee Flexion 130-140 , Knee Extension 0\par Palpation:MILD PATELLAR FACET TENDERNESS, No pain at joint line, patellar tendon, MFC/LFC, Medial/Lateral Tibial Plateau\par Leg Length Discrepancy:no\par Patella: no apprehension\par Distal Pulses: normal\par Lower Extremity Strength:normal, 5/5 \par Lower Extremity Reflexes:normal, 2+\par Lower Extremity Sensation: normal\par \par Special Tests:\par Henrik:Negative \par Anterior Drawer:Negative\par Anterior Lachman:Negative\par Posterior Drawer:Negative \par Varus/Valgus:Negative, no instability\par \par RIGHT LEG/ANKLE:\par \par Inspection: no ecchymosis, gross deformity, NO EDEMA\par Palpation: PAIN AT MIDSUBSTANCE OF GASTROCNEMIUS, NO pain at ATFL, CFL,deltoid ligament, joint line pain, lateral malleolus pain, medial malleolus pain, achilles pain,  fibular head pain, 5th metatarsal pain, tarsal or distal phalanx pain\par ROM: normal ankle dorsiflexion, ankle plantarflexion WITH MINIMAL PAIN, eversion, inversion\par Strength: 5/5\par Neurovascular: 2+ pulses distally\par Sensation: normal\par \par Special Tests: \par Anterior Drawer: Negative\par Talar Tilt: Negative\par External Rotation at Tib-Fib Syndesmosis: NEGATIVE\par Murillo Test: Negative\par Gait:FAVORS RIGHT SIDE\par PREM'S TEST: POSITIVE\par \par \par  [de-identified] : U/S Right Lower Extremity - Multiple views were reviewed with the patient in detail.  \par \par No evidence of DVT.  The prior popliteal cyst is no longer visualized.\par \par MRI Right Ankle - Multiple views were reviewed with the patient in detail.  \par \par Sprain of the ATFL.  Mild FHL tenosynovitis.  Mild Achilles tendinosis.  Chronic plantar fasciopathy of the central cord of the plantar aponeurosis 5cm from its calcaneal origin.  Diffuse circumferential subcutaneous edema.  No occult fracture.

## 2020-02-10 NOTE — DISCUSSION/SUMMARY
[de-identified] : The patient is a 49 year old woman with history of lumbar degenerative disc disease, right knee osteoarthritis who presents with chronic, atraumatic right calf pain.  She has evidence of right knee osteoarthritis, though limited ultrasound today does not show overt evidence of popliteal cyst or evidence of rupture.  Her ankle exam is otherwise unremarkable, and her MRI findings do not appear to entirely explain her symptoms.  The throbbing quality of her pain calls into question a vascular source of her pain.  Differential diagnosis includes ruptured popliteal cyst, gastrocnemius-soleus strain, popliteal artery entrapment, chronic compartment syndrome, fascial defect.\par \par Imaging was reviewed with the patient in detail.  All questions were answered appropriately.\par \par She is status post bladder stimulator, which precludes her from obtaining MRI of the right lower extremity.   Given this, we will have to settle with CT Right Lower Extremity to rule out mass.\par \par We will trial a course of physical therapy.  Patient was prescribed a course of physical therapy today.  The patient was also provided some general home exercises.  The patient was counseled on activity modification.\par \par She was given a referral for vascular surgery to rule out vascular insufficiency.\par \par Follow-up after CT.\par \par Patient appreciates and agrees with current plan.\par \par This note was generated using dragon medical dictation software.  A reasonable effort has been made for proofreading its contents, but typos may still remain.  If there are any questions or points of clarification needed please notify my office.\par \par \par

## 2020-02-10 NOTE — HISTORY OF PRESENT ILLNESS
[de-identified] : The patient is a 49 year old woman who presents for follow-up for right calf pain.\par \par The patient was last seen about 1 month ago on 1/9/2020.  She had initially been seen for ongoing bilateral knee pain, right worse than left.   She had an MRI of the right knee which showed right degenerative medial meniscus tear with tricompartmental osteoarthritis.  She had responded well to physical therapy and naproxen.\par \par She subsequently complained of atraumatic right calf pain.  She had a lower extremity doppler which showed:\par \par No evidence of DVT.  The prior popliteal cyst is no longer visualized.\par \par Her PMD also ordered a right ankle MRI which showed:\par \par Sprain of the ATFL.  Mild FHL tenosynovitis.  Mild Achilles tendinosis.  Chronic plantar fasciopathy of the central cord of the plantar aponeurosis 5cm from \par its calcaneal origin.  Diffuse circumferential subcutaneous edema.  No occult fracture.\par \par She denies ankle instability.  She states that her initial ankle swelling has significantly improved.  She still has significant calf pain.  She complains of calf pain at rest, which sometimes is worsened with activity.  She has pain with prolonged standing.  She described the pain as throbbing. gripping in nature.  She cannot recall a history of vascular disease.

## 2020-02-13 DIAGNOSIS — I73.9 PERIPHERAL VASCULAR DISEASE, UNSPECIFIED: ICD-10-CM

## 2020-02-27 ENCOUNTER — APPOINTMENT (OUTPATIENT)
Dept: ORTHOPEDIC SURGERY | Facility: CLINIC | Age: 50
End: 2020-02-27

## 2020-02-27 NOTE — PHYSICAL EXAM
[de-identified] : general: well appearing female\par RUE: \par no swelling or effusion. no erythema. no wounds, lacerations or abrasions. \par no TTP over palm. \par + tinnels and phalen's tests\par thenar atrophy noted when compared to Left side\par hand  5/5\par SILT over m/u/r nerves\par 2+ radial pulse\par \par LUE: \par no swelling or effusion. no erythema. no wounds, lacerations or abrasions. \par no TTP over palm. \par neg tinels and phalen's tests\par no thenar atrophy noted\par hand  5/5\par SILT over m/u/r nerves\par 2+ radial pulse  [de-identified] : None today

## 2020-02-27 NOTE — DISCUSSION/SUMMARY
[de-identified] : 49F presenting for f/u of b/l CTS, R>L, failed conservative mgmt, interested in scheduling R carpal tunnel release\par \par -Discussed risks/benefits of surgical intervention\par -Plan for surgical coordinator to reach out to choose surgical date\par

## 2020-02-27 NOTE — HISTORY OF PRESENT ILLNESS
[de-identified] : 49 F presenting for follow up visit who has B/L CTS R>L. last visit in Sep 2019 she was to RTC when ready to book OR.  Had EMG done with results showing b/l median nerve entrapment R>L. pt states she continues to have pain and it has progressed from last visit. reports now she has been dropping items from her right hand. Numbness and tingling continued.Has been wearing night splints with minimal relief. She would like to proceed with carpal tunnel release of R wrist.

## 2020-03-09 ENCOUNTER — APPOINTMENT (OUTPATIENT)
Dept: VASCULAR SURGERY | Facility: CLINIC | Age: 50
End: 2020-03-09
Payer: MEDICAID

## 2020-03-09 PROCEDURE — 93971 EXTREMITY STUDY: CPT

## 2020-03-09 PROCEDURE — 99204 OFFICE O/P NEW MOD 45 MIN: CPT

## 2020-03-13 NOTE — ADDENDUM
[FreeTextEntry1] : This note was written by Tabitha Odonnell on 03/09/2020 acting as scribe for Dr. Nevarez.\par

## 2020-03-13 NOTE — PHYSICAL EXAM
[Normal Breath Sounds] : Normal breath sounds [Normal Heart Sounds] : normal heart sounds [Normal Rate and Rhythm] : normal rate and rhythm [No Rash or Lesion] : No rash or lesion [Alert] : alert [Oriented to Person] : oriented to person [Oriented to Place] : oriented to place [Oriented to Time] : oriented to time [Calm] : calm [2+] : right 2+ [Varicose Veins Of Lower Extremities] : bilaterally [Ankle Swelling On The Right] : mild [JVD] : no jugular venous distention  [Ankle Swelling (On Exam)] : not present [] : not present [Abdomen Tenderness] : ~T ~M No abdominal tenderness [de-identified] : Well-appearing, NAD [de-identified] : NCAT [de-identified] : +FROM B/L

## 2020-03-13 NOTE — ASSESSMENT
[FreeTextEntry1] : 50 y/o F with R calf pain. On exam, patient had good femoral and popliteal pulses bilaterally. RLE Venous Duplex today shows no evidence of DVT and reflux. I informed patient that her pain is not vascular in nature and most likely musculoskeletal. \par She was recommended to follow-up with her orthopedist, Dr. Jacobs. I recommended taking Tylenol to relieve her pain. No vascular intervention needed at this time. Patient will follow-up here PRN.

## 2020-03-13 NOTE — END OF VISIT
[FreeTextEntry3] : All medical record entries made by the Scribe were at my, Dr. Nevarez's, discretion and personally dictated by me on 03/09/2020 . I have reviewed the chart and agree that the record accurately reflects my personal performance of the history, physical exam, assessment and plan. I have also personally directed, reviewed and agreed to the chart.

## 2020-03-13 NOTE — HISTORY OF PRESENT ILLNESS
[FreeTextEntry1] : 48 y/o F presents today for initial evaluation of RLE. Patient developed pain to her R calf last October and now it interferes with her daily activities. She states that the pain is worse with standing and walking. Denies any swelling, fever or chills. She denies hx of trauma to her leg, DVT, claudication.

## 2020-07-07 ENCOUNTER — APPOINTMENT (OUTPATIENT)
Dept: NEUROLOGY | Facility: CLINIC | Age: 50
End: 2020-07-07
Payer: MEDICAID

## 2020-07-07 VITALS
DIASTOLIC BLOOD PRESSURE: 73 MMHG | WEIGHT: 171 LBS | TEMPERATURE: 98.4 F | HEART RATE: 70 BPM | OXYGEN SATURATION: 97 % | HEIGHT: 61 IN | SYSTOLIC BLOOD PRESSURE: 119 MMHG | BODY MASS INDEX: 32.28 KG/M2

## 2020-07-07 DIAGNOSIS — S86.111A STRAIN OF OTHER MUSCLE(S) AND TENDON(S) OF POSTERIOR MUSCLE GROUP AT LOWER LEG LEVEL, RIGHT LEG, INITIAL ENCOUNTER: ICD-10-CM

## 2020-07-07 DIAGNOSIS — M79.661 PAIN IN RIGHT LOWER LEG: ICD-10-CM

## 2020-07-07 PROCEDURE — 99214 OFFICE O/P EST MOD 30 MIN: CPT

## 2020-07-07 NOTE — HISTORY OF PRESENT ILLNESS
[FreeTextEntry1] : Previously seen for CTS\par \par Now here for right calf pain\par Started in November, about a month after twisting her right ankle\par Only in right calf, no back pain, no numbness or tingling, no radiation \par She was going to PT which helped but stopped due to COVID, then restarted a few weeks ago\par She saw vascular - no DVT \par \par She cannot have an MRI due to a bladder implant

## 2020-07-07 NOTE — PHYSICAL EXAM
[FreeTextEntry1] : Gen: appears well, well-nourished, no acute distress\par \par MS: awake, alert, oriented, speech fluent, comprehension intact, good fund of knowledge, recent and remote memory intact, attention intact\par \par Motor: normal bulk and tone, 5/5 strength in LE b/l\par \par Sensory: light touch and pinprick intact and symmetric in LE b/l\par \par Reflexes: 2+ symmetric throughout\par \par Coordination: no dysmetria on finger to nose, Romberg negative\par \par Gait: normal, can walk on heels and toes\par \par MSK: straight leg raise negative; tenderness to palpation of right medial calf

## 2020-07-07 NOTE — ASSESSMENT
[FreeTextEntry1] : Pain is not consistent with neuropathic pain - more musculoskeletal \par She has no numbness, tingling, or weakness, and neurologic exam is normal\par MRI L Spine last year was unremarkable and her current symptoms are not suggestive of sciatica / radiculopathy (no back pain, no radiating pain down the leg) \par f/u with Dr. Jacobs\par return as needed

## 2020-07-10 ENCOUNTER — APPOINTMENT (OUTPATIENT)
Dept: ORTHOPEDIC SURGERY | Facility: CLINIC | Age: 50
End: 2020-07-10
Payer: MEDICAID

## 2020-07-10 VITALS
HEIGHT: 61 IN | DIASTOLIC BLOOD PRESSURE: 60 MMHG | OXYGEN SATURATION: 98 % | WEIGHT: 175 LBS | HEART RATE: 71 BPM | BODY MASS INDEX: 33.04 KG/M2 | SYSTOLIC BLOOD PRESSURE: 100 MMHG

## 2020-07-10 PROCEDURE — 20611 DRAIN/INJ JOINT/BURSA W/US: CPT | Mod: RT

## 2020-07-10 PROCEDURE — 99213 OFFICE O/P EST LOW 20 MIN: CPT | Mod: 25

## 2020-07-10 RX ORDER — ESTRADIOL 1 MG/1
1 TABLET ORAL
Qty: 30 | Refills: 0 | Status: ACTIVE | COMMUNITY
Start: 2019-08-29

## 2020-07-10 NOTE — PROCEDURE
[de-identified] : Ultrasound-Guided RIGHT Knee BAKER'S CYST ASPIRATION\par \par Indication for U/S Guidance: Ensure placement within the POPLITEAL SPACE/SEMIMEMBRANOSUS/MEDIAL GASTROC BURSA for diagnostic purposes, while avoiding neurovascular structures\par \par Indication for Injection: BAKER'S CYST\par \par A discussion was had with the patient regarding this procedure and all questions were answered. All risks, benefits and alternatives were discussed. These included but were not limited to bleeding, infection, and allergic reaction. A timeout was done to ensure correct side and pt agreed to the procedure. Betadine was used to sterilize and prep the area, and alcohol was used to clean the skin in the posterior aspect of the knee joint. The semimembranosus/medial head gastrocnemius was identified and bursa was visualized utilizing the Sonosite, linear transducer. The baker's cyst was visualized in the long axis and an in-plane approach was used for the injection. Ultrasound guidance was utilized to ensure accuracy of the aspiration, and avoid the neurovascular structures. A 25-gauge 1.5" needle was used to inject 4cc of 1% lidocaine without epi.  This was followed by aspiration with an 18-gauge 1.5" needle with aspiration of 2cc of clear, yellow, nonpurulent synovial fluid.  This was followed by injection with 1cc of KENALOG.   A sterile bandage was then applied. The patient tolerated the procedure well and there were no complications.\par

## 2020-07-10 NOTE — DISCUSSION/SUMMARY
[Medication Risks Reviewed] : Medication risks reviewed [de-identified] : The patient is a 49 year old woman with history of lumbar degenerative disc disease, right knee osteoarthritis who presents with chronic, atraumatic right proximal-medial calf pain.  She has had negative vascular/neurologic workup.  I believe her pain is related to symptomatic right baker's cyst causing some mass effect symptoms of posterior leg compartment.\par \par The patient was counseled on the natural progression of the problem today.  The patient was provided reassurance today.\par \par After informed consent, and explanation of risks, benefits, alternatives, adverse effects of injection, which includes but is not limited to infection, bleeding, allergic reaction, swelling, failure to improve symptoms, the patient would like to proceed with the procedure - RIGHT BAKER'S CYST ULTRASOUND-GUIDED ASPIRATION AND CORTICOSTEROID INJECTION. See procedure note above. Patient tolerated the procedure well. The patient was provided with postinjection instructions.\par \par Patient is responding to PT and shoulder continue supervised PT.  She was provided with new prescription today.\par \par We discussed that her baker's cyst is likely a result of underlying OA, and treatment shoulder be directed to prevent further progression of OA.  She was counseled on activity modification.\par \par Follow-up in 6-8 weeks.\par \par \par Patient appreciates and agrees with current plan.\par \par This note was generated using dragon medical dictation software.  A reasonable effort has been made for proofreading its contents, but typos may still remain.  If there are any questions or points of clarification needed please notify my office.\par \par >15 minutes of time was spent in total for the encounter.  >50% of the time spent was on counseling/coordination of care and medical-decision making for this patient.\par \par

## 2020-07-10 NOTE — PHYSICAL EXAM
[de-identified] : General: Well-nourished, well-developed, alert, and in no acute distress.\par Head: Normocephalic.\par Eyes: Pupils equal round reactive to light and accommodation, extraocular muscles intact, normal sclera.\par Nose: No nasal discharge.\par Cardiac: Regular rate. Extremities are warm and well perfused. Distal pulses are symmetric bilaterally.\par Respiratory: No labored breathing.\par Extremities: Sensation is intact distally bilaterally.  Distal pulses are symmetric bilaterally, 1+ RIGHT LOWER EXTREMITY EDEMA, PITTING\par Neurologic: No focal deficits\par Skin: Normal skin color, texture, and turgor\par Psychiatric: Normal affect\par \par RIGHT KNEE:\par \par Inspection: no bruising, swelling, erythema\par Joint Effusion:MINIMAL\par ROM: Knee Flexion 130 DEGREES , Knee Extension 0\par Palpation:NO JOINT LINE PAIN, MINIMAL PATELLAE FACET TENDERNESS, NO PAIN AT MFC/LFC, Medial/Lateral Tibial Plateau\par Leg Length Discrepancy:no\par Patella: no apprehension\par Distal Pulses: normal\par Lower Extremity Strength:KNEE EXTENSION 5/5, KNEE FLEXION 5/5 \par Lower Extremity Reflexes:normal, 2+\par Lower Extremity Sensation: normal\par \par Special Tests:\par [facility]:Negative\par Anterior Drawer:Negative\par Anterior Lachman:Negative\par Posterior Drawer:Negative \par Varus/Valgus:Negative, no instability\par \par LEFT KNEE:\par \par Inspection: no bruising, swelling, erythema\par Joint Effusion:no \par ROM: Knee Flexion 130-140 , Knee Extension 0\par Palpation:MILD PATELLAR FACET TENDERNESS, No pain at joint line, patellar tendon, MFC/LFC, Medial/Lateral Tibial Plateau\par Leg Length Discrepancy:no\par Patella: no apprehension\par Distal Pulses: normal\par Lower Extremity Strength:normal, 5/5 \par Lower Extremity Reflexes:normal, 2+\par Lower Extremity Sensation: normal\par \par Special Tests:\par Henrik:Negative \par Anterior Drawer:Negative\par Anterior Lachman:Negative\par Posterior Drawer:Negative \par Varus/Valgus:Negative, no instability\par \par RIGHT LEG/ANKLE:\par \par Inspection: no ecchymosis, gross deformity, NO EDEMA\par Palpation: PAIN AT PROXIMAL MEDIAL GASTROCNEMIUS, NO pain at ATFL, CFL,deltoid ligament, joint line pain, lateral malleolus pain, medial malleolus pain, achilles pain,  fibular head pain, 5th metatarsal pain, tarsal or distal phalanx pain\par ROM: normal ankle dorsiflexion WITH MILD PAIN AT END ROM, ankle plantarflexion WITH MINIMAL PAIN, eversion, inversion\par Strength: 5/5\par Neurovascular: 2+ pulses distally\par Sensation: normal\par \par Special Tests: \par Anterior Drawer: Negative\par Talar Tilt: Negative\par External Rotation at Tib-Fib Syndesmosis: NEGATIVE\par Murillo Test: Negative\par Gait:NORMAL, RECIPROCAL\par PREM'S TEST: NEGATIVE\par \par \par  [de-identified] : CT Right Tibia-Fibula without contrast on 2/12/2020 at [facility] - Small popliteal cyst.  Small effusion at knee joint.

## 2020-07-10 NOTE — HISTORY OF PRESENT ILLNESS
[de-identified] : The patient is a 49 year old woman who presents for follow-up for right calf pain.\par \par The patient was last seen about 5 months ago for persistent right calf pain, with rest pain which was worsened with activity.  She had a lower extremity duplex which was negative for DVT.  The patient had an evaluation with neurology and vascular surgery and it was felt that her pain was likely musculoskeletal in nature.  Her PMD also ordered a right ankle MRI which showed:\par \par Sprain of the ATFL.  Mild FHL tenosynovitis.  Mild Achilles tendinosis.  Chronic plantar fasciopathy of the central cord of the plantar aponeurosis 5cm from \par its calcaneal origin.  Diffuse circumferential subcutaneous edema.  No occult fracture.\par \par She had initially been seen for ongoing bilateral knee pain, right worse than left.   She had an MRI of the right knee which showed right degenerative medial meniscus tear with tricompartmental osteoarthritis and popliteal cyst.  She had responded well to physical therapy and naproxen.  She denies rest pain or nighttime symptoms.  Pain is mostly with prolonged standing or prolonged walking.  Pain is localized to proximal medial calf.\par

## 2020-08-05 DIAGNOSIS — M53.3 SACROCOCCYGEAL DISORDERS, NOT ELSEWHERE CLASSIFIED: ICD-10-CM

## 2020-08-07 ENCOUNTER — APPOINTMENT (OUTPATIENT)
Dept: ORTHOPEDIC SURGERY | Facility: CLINIC | Age: 50
End: 2020-08-07
Payer: MEDICAID

## 2020-08-07 DIAGNOSIS — S86.111D STRAIN OF OTHER MUSCLE(S) AND TENDON(S) OF POSTERIOR MUSCLE GROUP AT LOWER LEG LEVEL, RIGHT LEG, SUBSEQUENT ENCOUNTER: ICD-10-CM

## 2020-08-07 DIAGNOSIS — M51.36 OTHER INTERVERTEBRAL DISC DEGENERATION, LUMBAR REGION: ICD-10-CM

## 2020-08-07 DIAGNOSIS — S39.012A STRAIN OF MUSCLE, FASCIA AND TENDON OF LOWER BACK, INITIAL ENCOUNTER: ICD-10-CM

## 2020-08-07 PROBLEM — M53.3 SACROILIAC JOINT PAIN: Status: ACTIVE | Noted: 2020-08-07

## 2020-08-07 PROCEDURE — 99213 OFFICE O/P EST LOW 20 MIN: CPT

## 2020-08-10 PROBLEM — S39.012A LUMBAR STRAIN, INITIAL ENCOUNTER: Status: ACTIVE | Noted: 2020-08-07

## 2020-08-10 PROBLEM — S86.111D STRAIN OF RIGHT GASTROCNEMIUS MUSCLE, SUBSEQUENT ENCOUNTER: Status: ACTIVE | Noted: 2020-07-10

## 2020-08-10 PROBLEM — M51.36 DISC DEGENERATION, LUMBAR: Status: ACTIVE | Noted: 2019-06-17

## 2020-08-10 NOTE — PHYSICAL EXAM
[de-identified] : General: Well-nourished, well-developed, alert, and in no acute distress.\par Head: Normocephalic.\par Eyes: Pupils equal round reactive to light and accommodation, extraocular muscles intact, normal sclera.\par Nose: No nasal discharge.\par Cardiac: Regular rate. Extremities are warm and well perfused. Distal pulses are symmetric bilaterally.\par Respiratory: No labored breathing.\par Extremities: Sensation is intact distally bilaterally.  Distal pulses are symmetric bilaterally, 1+ RIGHT LOWER EXTREMITY EDEMA, PITTING\par Neurologic: No focal deficits\par Skin: Normal skin color, texture, and turgor\par Psychiatric: Normal affect\par \par RIGHT KNEE:\par \par Inspection: no bruising, swelling, erythema\par Joint Effusion:MINIMAL\par ROM: Knee Flexion 130 DEGREES , Knee Extension 0\par Palpation:NO JOINT LINE PAIN, MINIMAL PATELLAE FACET TENDERNESS, NO PAIN AT MFC/LFC, Medial/Lateral Tibial Plateau\par Leg Length Discrepancy:no\par Patella: no apprehension\par Distal Pulses: normal\par Lower Extremity Strength:KNEE EXTENSION 5/5, KNEE FLEXION 5/5 \par Lower Extremity Reflexes:normal, 2+\par Lower Extremity Sensation: normal\par \par Special Tests:\par [facility]:Negative\par Anterior Drawer:Negative\par Anterior Lachman:Negative\par Posterior Drawer:Negative \par Varus/Valgus:Negative, no instability\par \par LEFT KNEE:\par \par Inspection: no bruising, swelling, erythema\par Joint Effusion:no \par ROM: Knee Flexion 130-140 , Knee Extension 0\par Palpation:MILD PATELLAR FACET TENDERNESS, No pain at joint line, patellar tendon, MFC/LFC, Medial/Lateral Tibial Plateau\par Leg Length Discrepancy:no\par Patella: no apprehension\par Distal Pulses: normal\par Lower Extremity Strength:normal, 5/5 \par Lower Extremity Reflexes:normal, 2+\par Lower Extremity Sensation: normal\par \par Special Tests:\par Henrik:Negative \par Anterior Drawer:Negative\par Anterior Lachman:Negative\par Posterior Drawer:Negative \par Varus/Valgus:Negative, no instability\par \par RIGHT LEG/ANKLE:\par \par Inspection: no ecchymosis, gross deformity, NO EDEMA\par Palpation: TRACE PAIN AT PROXIMAL MEDIAL GASTROCNEMIUS, NO pain at ATFL, CFL,deltoid ligament, joint line pain, lateral malleolus pain, medial malleolus pain, achilles pain,  fibular head pain, 5th metatarsal pain, tarsal or distal phalanx pain\par ROM: normal ankle dorsiflexion, ankle plantarflexion, eversion, inversion\par Strength: 5/5\par Neurovascular: 2+ pulses distally\par Sensation: normal\par \par Special Tests: \par Anterior Drawer: Negative\par Talar Tilt: Negative\par External Rotation at Tib-Fib Syndesmosis: NEGATIVE\par Murillo Test: Negative\par Gait:NORMAL, RECIPROCAL\par PREM'S TEST: NEGATIVE\par \par Low Back:\par \par Inspection:\par ·	Alignment: No scoliosis or deformity. \par ·	Spasm not noted. \par ·	No scars\par \par Palpation:   \par ·	SI  Joints:  RIGHT>LEFT pain\par ·	Iliolumbar ligaments    BILATERAL pain\par ·	Quadratus lumborum   RIGHT> LEFT pain\par ·	Interspinous ligament  NO pain\par ·	Greater trochanter  NO pain\par ·	Mid thoracic spine: NO  pain\par \par \par ROM: \par ·	 Flexion:  70 degrees, without pain.\par ·	Extension:  5-10 degrees without pain  \par ·	Side Bending:  Full, without pain \par \par \par Strength: \par ·	Core:  Trendelenburg: FAIR-GOOD\par ·	Hip / Leg Flexion, Extension  5/5\par ·	Foot Eversion/ Inversion:   5/5\par ·	Calf:   5/5\par ·	EHL:  5/5\par \par NEURO  \par ·	Sensation:   Intact throughout the lower extremity\par ·	DTR's:  Symmetric throughout the lower extremity.  \par ·	Upper Motor Neuron:\par                 Harris Test: NEGATIVE\par                 Romberg Test: NEGATIVE\par                 Clonus: NEGATIVE\par \par Other tests: \par ·	Slump test: NEGATIVE\par ·	Straight leg raise test:   NEGATIVE\par ·	Stork: NEGATIVE\par Vascular:  \par ·	No cyanosis, clubbing, edema.  \par ·	Palpable dorsalis pedis.\par \par \par \par

## 2020-08-10 NOTE — DISCUSSION/SUMMARY
[de-identified] : The patient is a 49 year old woman with history of lumbar degenerative disc disease, right knee osteoarthritis who presents with acute on chronic low back pain.  She does not have any focal deficits.  Her pain is likely musculogenic related to lumbar strain/SI joint dysfunction.  She also has chronic, atraumatic right proximal-medial calf pain.  She has had negative vascular/neurologic workup.  Her pain is significantly improved after aspiration/cortisone injection of baker's cyst and PT for gastrocnemius.  \par \par \par I discussed the treatment of low back pain with the patient at length today. I described the spectrum of treatment from nonoperative modalities to surgery. Noninvasive and nonoperative treatment modalities include relative rest, weight reduction, activity modification, PRN use of acetaminophen/ anti-inflammatory medication if tolerated, corticosteroids if indicated, home exercise program and physical therapy.  We also discussed adjunctive treatment including heat therapy, accupuncture, manual therapy, TENS unit.  Further treatments can include interventional spine procedures such as epidural injections, and surgical consultation.\par \par The patient was also provided some general home exercises.  The patient was counseled on activity modification.\par \par Patient was prescribed a short course of Diclofenac .Appropriate use of medication was reviewed with the patient in detail. Risks, benefits, and adverse effects medication were discussed.\par \par Patient appreciates and agrees with current plan.\par \par This note was generated using dragon medical dictation software.  A reasonable effort has been made for proofreading its contents, but typos may still remain.  If there are any questions or points of clarification needed please notify my office.\par \par >15 minutes of time was spent in total for the encounter.  >50% of the time spent was on counseling/coordination of care and medical-decision making for this patient.\par \par

## 2020-08-10 NOTE — HISTORY OF PRESENT ILLNESS
[de-identified] : The patient is a 49 year old woman who presents for follow-up for right calf pain.\par \par The patient was last seen about 1 month ago for persistent right calf pain, which had been ongoing for 5-6 months.  The pain had been having rest pain which was worsened with activity.  She had a lower extremity duplex which was negative for DVT.  The patient had an evaluation with neurology and vascular surgery and it was felt that her pain was likely musculoskeletal in nature.  At her last visit, we discussed a symptomatic baker's cyst as a possible cause for her pain, and after discussion, she agreed to ultrasound-guided baker's cyst aspiration and cortisone injection.  She was instructed to continue PT.  She states that her pain is significantly improved, and PT has been helpful.\par \par She now complains of a one week history of acute on chronic, atraumatic, midline low back pain.  The patient denies distal numbness, weakness, paresthesias.  The patient denies red flag symptoms including fever, chills, weight loss, night sweats, bowel dysfunction, saddle anesthesia.  She has a history of overactive bladder and is currently dealing with a UTI.  She denies flank pain.\par \par Previous History:\par Her PMD also ordered a right ankle MRI which showed:\par \par Sprain of the ATFL.  Mild FHL tenosynovitis.  Mild Achilles tendinosis.  Chronic plantar fasciopathy of the central cord of the plantar aponeurosis 5cm from \par its calcaneal origin.  Diffuse circumferential subcutaneous edema.  No occult fracture.\par \par She had initially been seen for ongoing bilateral knee pain, right worse than left.   She had an MRI of the right knee which showed right degenerative medial meniscus tear with tricompartmental osteoarthritis and popliteal cyst.  \par

## 2020-09-18 ENCOUNTER — APPOINTMENT (OUTPATIENT)
Dept: VASCULAR SURGERY | Facility: CLINIC | Age: 50
End: 2020-09-18
Payer: MEDICARE

## 2020-09-18 PROCEDURE — 99212 OFFICE O/P EST SF 10 MIN: CPT

## 2020-09-18 NOTE — ASSESSMENT
[FreeTextEntry1] : 49 y/o F with RLE varicose veins. On exam, patient had good femoral and popliteal pulses bilaterally with numerous spider veins over B/L lower extremities.  I informed patient that her pain is not vascular in nature and most likely musculoskeletal, recommended she stay active, weight loss. Her B/L LE findings c/w spider veins. Discussed sclerotherapy , this is a cosmetic procedure, therefore Insurance does not cover them. Each session would be paid out of pocket and is approximately $400-$500, given patients light skin discussed possible residual hyperpigmentation, not indicated at this time. No further procedures required. Patient will follow-up here PRN.

## 2020-09-18 NOTE — HISTORY OF PRESENT ILLNESS
[FreeTextEntry1] : 51 y/o F presents today for follow up evaluation of RLE varicose veins. Patient reports she feels her legs are pruritic, with intermittent cramping and would like to discuss treatment options. Denies any swelling, fever or chills. She denies hx of trauma to her leg, DVT, claudication.

## 2020-09-18 NOTE — ADDENDUM
[FreeTextEntry1] : This note was written by Jenelle España on 09/18/2020 acting as scribe for Tigist Wang M.D.\par \par I, Tigist Ireland, have read and attest that all the information, medical decision making and discharge instructions within are true and accurate.

## 2020-09-18 NOTE — PHYSICAL EXAM
[Normal Breath Sounds] : Normal breath sounds [Normal Heart Sounds] : normal heart sounds [Normal Rate and Rhythm] : normal rate and rhythm [2+] : right 2+ [No Rash or Lesion] : No rash or lesion [Alert] : alert [Oriented to Person] : oriented to person [Oriented to Place] : oriented to place [Oriented to Time] : oriented to time [Calm] : calm [JVD] : no jugular venous distention  [Ankle Swelling (On Exam)] : not present [Varicose Veins Of Lower Extremities] : not present [] : not present [Abdomen Tenderness] : ~T ~M No abdominal tenderness [de-identified] : Well-appearing, NAD [de-identified] : NCAT [de-identified] : +FROM B/L [de-identified] : Numerous spider veins over B/L lower extremities.

## 2020-10-06 ENCOUNTER — APPOINTMENT (OUTPATIENT)
Dept: ENDOCRINOLOGY | Facility: CLINIC | Age: 50
End: 2020-10-06
Payer: MEDICAID

## 2020-10-06 VITALS
BODY MASS INDEX: 32.12 KG/M2 | SYSTOLIC BLOOD PRESSURE: 107 MMHG | HEART RATE: 62 BPM | DIASTOLIC BLOOD PRESSURE: 70 MMHG | WEIGHT: 170 LBS

## 2020-10-06 PROCEDURE — 99205 OFFICE O/P NEW HI 60 MIN: CPT

## 2020-10-09 LAB
25(OH)D3 SERPL-MCNC: 31.2 NG/ML
ALBUMIN SERPL ELPH-MCNC: 4.2 G/DL
ALP BLD-CCNC: 162 U/L
ALT SERPL-CCNC: 38 U/L
ANION GAP SERPL CALC-SCNC: 14 MMOL/L
AST SERPL-CCNC: 39 U/L
BASOPHILS # BLD AUTO: 0.02 K/UL
BASOPHILS NFR BLD AUTO: 0.3 %
BILIRUB SERPL-MCNC: 0.5 MG/DL
BUN SERPL-MCNC: 14 MG/DL
CALCIUM SERPL-MCNC: 9.4 MG/DL
CHLORIDE SERPL-SCNC: 101 MMOL/L
CHOLEST SERPL-MCNC: 175 MG/DL
CHOLEST/HDLC SERPL: 2.9 RATIO
CO2 SERPL-SCNC: 23 MMOL/L
CREAT SERPL-MCNC: 0.76 MG/DL
EOSINOPHIL # BLD AUTO: 0.33 K/UL
EOSINOPHIL NFR BLD AUTO: 4.7 %
ESTIMATED AVERAGE GLUCOSE: 105 MG/DL
GLUCOSE SERPL-MCNC: 71 MG/DL
HBA1C MFR BLD HPLC: 5.3 %
HCT VFR BLD CALC: 40.4 %
HDLC SERPL-MCNC: 60 MG/DL
HGB BLD-MCNC: 13.3 G/DL
IMM GRANULOCYTES NFR BLD AUTO: 0.1 %
LDLC SERPL CALC-MCNC: 81 MG/DL
LYMPHOCYTES # BLD AUTO: 1.16 K/UL
LYMPHOCYTES NFR BLD AUTO: 16.6 %
MAN DIFF?: NORMAL
MCHC RBC-ENTMCNC: 29 PG
MCHC RBC-ENTMCNC: 32.9 GM/DL
MCV RBC AUTO: 88.2 FL
MONOCYTES # BLD AUTO: 0.61 K/UL
MONOCYTES NFR BLD AUTO: 8.7 %
NEUTROPHILS # BLD AUTO: 4.85 K/UL
NEUTROPHILS NFR BLD AUTO: 69.6 %
PLATELET # BLD AUTO: 192 K/UL
POTASSIUM SERPL-SCNC: 4.4 MMOL/L
PROT SERPL-MCNC: 6.7 G/DL
RBC # BLD: 4.58 M/UL
RBC # FLD: 13.8 %
SODIUM SERPL-SCNC: 138 MMOL/L
TRIGL SERPL-MCNC: 170 MG/DL
TSH SERPL-ACNC: 0.7 UIU/ML
VIT B12 SERPL-MCNC: 735 PG/ML
WBC # FLD AUTO: 6.98 K/UL

## 2020-10-09 NOTE — PHYSICAL EXAM
[Alert] : alert [Healthy Appearance] : healthy appearance [No Acute Distress] : no acute distress [Normal Sclera/Conjunctiva] : normal sclera/conjunctiva [No Neck Mass] : no neck mass was observed [No LAD] : no lymphadenopathy [Supple] : the neck was supple [Thyroid Not Enlarged] : the thyroid was not enlarged [No Respiratory Distress] : no respiratory distress [Clear to Auscultation] : lungs were clear to auscultation bilaterally [Normal S1, S2] : normal S1 and S2 [Normal Rate] : heart rate was normal [Regular Rhythm] : with a regular rhythm [No Stigmata of Cushings Syndrome] : no stigmata of Cushings Syndrome [Normal Gait] : normal gait [Normal Insight/Judgement] : insight and judgment were intact [Kyphosis] : no kyphosis present [Acanthosis Nigricans] : no acanthosis nigricans [de-identified] : no moon facies, no supraclavicular fat pads

## 2020-10-09 NOTE — ASSESSMENT
[FreeTextEntry1] : Hypothyroidism. She has been clinically euthyroid on her current regimen of levothyroxine other than weight gain. We reviewed proper use and compliance with levothyroxine. \par Continue levothyroxine 75 mcg daily pending TSH for goal 0.5-2.5 uIU/mL \par \par Elevated body mass index. We reviewed lifestyle modifications for weight loss. We discussed referral to nutrition. Pharmacologic options for weight loss are limited due to concurrent medications; we can consider a GLP-1 receptor agonist next visit as needed. \par Laboratory evaluation as below\par Lifestyle modification\par Referral to nutrition \par \par Sleep disturbance. I referred her to sleep medicine providers within the [facility] system.\par \par CC:\par Dr. Arpita Ron, Fax [phone]

## 2020-10-09 NOTE — ADDENDUM
[FreeTextEntry1] : Recent laboratory results as below. Alkaline phosphatase elevated and recommend follow-up with primary care. Nonfasting triglycerides borderline elevated; her 10 year risk of heart disease or stroke is 0.7% and statin therapy not indicated. TSH within range on current regimen of levothyroxine. HbA1c within range. Other test results within range. I left a message for Ms. Cui for call back to discuss; I will send a letter with results. 10/09/20

## 2020-10-09 NOTE — HISTORY OF PRESENT ILLNESS
[FreeTextEntry1] : Ms. Cui is a 50 year-old woman presenting to establish care with me for hypothyroidism and elevated body mass index.\par \par Hypothyroidism.\par She was diagnosed with hypothyroidism around 2003. \par She has been on levothyroxine 75 mcg daily for the past year.\par She is taking levothyroxine in the morning, on an empty stomach, with plain water, and waiting at least 30 minutes before eating. She is taking a multivitamin and is  from levothyroxine by least 90 minutes. \par No history of radiation exposure.\par Mother with history of hypothyroidism. \par \par Elevated body mass index.\par Her early adult weight was 140 pounds. Her lowest weight was 123 pounds with diet and exercise. Her weight gradually fan to 180 pounds, back down to 170 pounds.\par She has been trying to eat less bagels, doughnuts, and pizza. She has been trying to walk more. She has not been able to go to the gym during the pandemic.\par She has seen a nutritionist in the past.\par She has not tried any medications for weight loss.\par 24 hour diet recall: breakfast, Chex cereal, multigrain Cheerios, 2% milk; lunch, none; dinner, two peanut butter and jelly sandwiches, Cheez-it crackers; snack, cookies and milk; occasional juice or soda\par Exercise: Walks; she has difficulty with motivation for exercise\par \par She has a history of hysterectomy and is on estradiol 1 mg daily per gynecology. She is following with urology for polyuria. She has constipation, depression/anxiety, sleep disturbance; discussed with her other treating physicians.

## 2020-10-22 ENCOUNTER — APPOINTMENT (OUTPATIENT)
Dept: ORTHOPEDIC SURGERY | Facility: CLINIC | Age: 50
End: 2020-10-22
Payer: MEDICARE

## 2020-10-22 PROCEDURE — 99212 OFFICE O/P EST SF 10 MIN: CPT

## 2020-10-22 NOTE — DISCUSSION/SUMMARY
[de-identified] : 50 F w b/l CTS R>L presenting for follow for discussion regarding surgery. at this time after an educational discussion she would like to proceed with surgery. pt was referred to a hand specialist, Dr. Owens, for surgery. She is to call office to schedule an appointment. In the mean time pt was told she would need both medical clearance and a fairly recent covid test.

## 2020-10-22 NOTE — HISTORY OF PRESENT ILLNESS
[de-identified] : Ms. Cui is here for a follow up for her b/l CTS. pt states although she was planning to have surgery prior with us, due to other medical conditions she had to postpone treatment. She is back today to talk about surgical intervention due to continued and worsening symptoms R>L

## 2020-10-27 ENCOUNTER — APPOINTMENT (OUTPATIENT)
Dept: ORTHOPEDIC SURGERY | Facility: CLINIC | Age: 50
End: 2020-10-27
Payer: MEDICAID

## 2020-10-28 ENCOUNTER — APPOINTMENT (OUTPATIENT)
Dept: ENDOCRINOLOGY | Facility: CLINIC | Age: 50
End: 2020-10-28
Payer: MEDICARE

## 2020-10-28 PROCEDURE — 99072 ADDL SUPL MATRL&STAF TM PHE: CPT

## 2020-10-28 PROCEDURE — 97802 MEDICAL NUTRITION INDIV IN: CPT

## 2020-11-03 ENCOUNTER — APPOINTMENT (OUTPATIENT)
Dept: ORTHOPEDIC SURGERY | Facility: CLINIC | Age: 50
End: 2020-11-03
Payer: MEDICAID

## 2020-11-03 VITALS — WEIGHT: 170 LBS | RESPIRATION RATE: 16 BRPM | HEIGHT: 61 IN | BODY MASS INDEX: 32.1 KG/M2

## 2020-11-03 DIAGNOSIS — Z82.62 FAMILY HISTORY OF OSTEOPOROSIS: ICD-10-CM

## 2020-11-03 DIAGNOSIS — Z86.39 PERSONAL HISTORY OF OTHER ENDOCRINE, NUTRITIONAL AND METABOLIC DISEASE: ICD-10-CM

## 2020-11-03 PROCEDURE — 99213 OFFICE O/P EST LOW 20 MIN: CPT

## 2020-11-03 PROCEDURE — 99203 OFFICE O/P NEW LOW 30 MIN: CPT

## 2020-11-03 PROCEDURE — 99072 ADDL SUPL MATRL&STAF TM PHE: CPT

## 2020-11-16 ENCOUNTER — APPOINTMENT (OUTPATIENT)
Dept: ORTHOPEDIC SURGERY | Facility: AMBULATORY SURGERY CENTER | Age: 50
End: 2020-11-16

## 2020-11-24 ENCOUNTER — APPOINTMENT (OUTPATIENT)
Dept: ORTHOPEDIC SURGERY | Facility: CLINIC | Age: 50
End: 2020-11-24

## 2020-12-04 ENCOUNTER — LABORATORY RESULT (OUTPATIENT)
Age: 50
End: 2020-12-04

## 2020-12-06 ENCOUNTER — TRANSCRIPTION ENCOUNTER (OUTPATIENT)
Age: 50
End: 2020-12-06

## 2020-12-07 ENCOUNTER — OUTPATIENT (OUTPATIENT)
Dept: OUTPATIENT SERVICES | Facility: HOSPITAL | Age: 50
LOS: 1 days | Discharge: HOME | End: 2020-12-07
Payer: MEDICARE

## 2020-12-07 ENCOUNTER — APPOINTMENT (OUTPATIENT)
Dept: ORTHOPEDIC SURGERY | Facility: AMBULATORY SURGERY CENTER | Age: 50
End: 2020-12-07

## 2020-12-07 PROCEDURE — 29848 WRIST ENDOSCOPY/SURGERY: CPT | Mod: RT

## 2020-12-07 PROCEDURE — 25020 DECOMPRESS FOREARM 1 SPACE: CPT | Mod: RT

## 2020-12-17 ENCOUNTER — APPOINTMENT (OUTPATIENT)
Dept: ORTHOPEDIC SURGERY | Facility: CLINIC | Age: 50
End: 2020-12-17
Payer: MEDICAID

## 2020-12-17 VITALS — WEIGHT: 170 LBS | RESPIRATION RATE: 16 BRPM | HEIGHT: 71 IN | BODY MASS INDEX: 23.8 KG/M2

## 2020-12-17 PROCEDURE — 99024 POSTOP FOLLOW-UP VISIT: CPT

## 2020-12-24 ENCOUNTER — APPOINTMENT (OUTPATIENT)
Dept: ORTHOPEDIC SURGERY | Facility: CLINIC | Age: 50
End: 2020-12-24

## 2020-12-30 ENCOUNTER — APPOINTMENT (OUTPATIENT)
Dept: ORTHOPEDIC SURGERY | Facility: CLINIC | Age: 50
End: 2020-12-30
Payer: MEDICARE

## 2020-12-30 DIAGNOSIS — M22.2X2 PATELLOFEMORAL DISORDERS, RIGHT KNEE: ICD-10-CM

## 2020-12-30 DIAGNOSIS — M22.2X1 PATELLOFEMORAL DISORDERS, RIGHT KNEE: ICD-10-CM

## 2020-12-30 PROCEDURE — 99072 ADDL SUPL MATRL&STAF TM PHE: CPT

## 2020-12-30 PROCEDURE — 20610 DRAIN/INJ JOINT/BURSA W/O US: CPT | Mod: LT

## 2020-12-30 PROCEDURE — 99213 OFFICE O/P EST LOW 20 MIN: CPT | Mod: 25

## 2020-12-30 NOTE — PHYSICAL EXAM
[de-identified] : General: Well-nourished, well-developed, alert, and in no acute distress.\par Head: Normocephalic.\par Eyes: Pupils equal round reactive to light and accommodation, extraocular muscles intact, normal sclera.\par Nose: No nasal discharge.\par Cardiac: Regular rate. Extremities are warm and well perfused. Distal pulses are symmetric bilaterally.\par Respiratory: No labored breathing.\par Extremities: Sensation is intact distally bilaterally.  Distal pulses are symmetric bilaterally, 1+ RIGHT LOWER EXTREMITY EDEMA, PITTING\par Neurologic: No focal deficits\par Skin: Normal skin color, texture, and turgor\par Psychiatric: Normal affect\par \par RIGHT KNEE:\par \par Inspection: no bruising, swelling, erythema\par Joint Effusion:MINIMAL\par ROM: Knee Flexion 130 DEGREES , Knee Extension 0\par Palpation:MEDIAL JOINT LINE PAIN,  MINIMAL PATELLAE FACET TENDERNESS, NO PAIN AT MFC/LFC, Medial/Lateral Tibial Plateau\par Leg Length Discrepancy:no\par Patella: no apprehension\par Distal Pulses: normal\par Lower Extremity Strength:KNEE EXTENSION 5/5, KNEE FLEXION 5/5 \par Lower Extremity Reflexes:normal, 2+\par Lower Extremity Sensation: normal\par \par Special Tests:\par [facility]:Negative\par Anterior Drawer:Negative\par Posterior Drawer:Negative \par Varus/Valgus:Negative, no instability\par \par LEFT KNEE:\par \par Inspection: no bruising, swelling, erythema\par Joint Effusion:no \par ROM: Knee Flexion 130-140 , Knee Extension 0\par Palpation:MEDIAL JOINT LINE PAIN, MILD PATELLAR FACET TENDERNESS, No pain at joint line, patellar tendon, MFC/LFC, Medial/Lateral Tibial Plateau\par Leg Length Discrepancy:no\par Patella: no apprehension\par Distal Pulses: normal\par Lower Extremity Strength:normal, 5/5 \par Lower Extremity Reflexes:normal, 2+\par Lower Extremity Sensation: normal\par \par Special Tests:\par [facility]:Negative \par Anterior Drawer:Negative\par Posterior Drawer:Negative \par Varus/Valgus:Negative, no instability\par \par

## 2020-12-30 NOTE — DISCUSSION/SUMMARY
[Medication Risks Reviewed] : Medication risks reviewed [de-identified] : The patient is a 49 year old woman with history of lumbar degenerative disc disease, presenting with bilateral anteromedial knee pain likely secondary to knee osteoarthritis.\par \par I discussed the treatment of degenerative arthritis with the patient at length today. I described the spectrum of treatment from nonoperative modalities to total joint arthroplasty. Noninvasive and nonoperative treatment modalities include weight reduction, activity modification with low impact exercise, PRN use of acetaminophen or anti-inflammatory medication if tolerated, natural supplements such as glucosamine/chondroitin, and physical therapy. Further treatments can include corticosteroid injection, hyaluronic acid injections, and orthobiologic such as PRP. Definitive treatment can certainly include total joint arthroplasty, but patient would require surgical consultation to discuss that option further. The risks and benefits of each treatment options was discussed and all questions were answered.\par \par \par After informed consent, and explanation of risks, benefits, alternatives, adverse effects of injection, which includes but is not limited to infection, bleeding, allergic reaction, swelling, soft tissue weakening/tendon rupture, injection site complication, fat atrophy, skin depigmentation, failure to improve symptoms, the patient would like to proceed with the procedure - BILATERAL KNEE CORTICOSTEROID INJECTIONS. See procedure note above. Patient tolerated the procedure well. The patient was provided with postinjection instructions.\par \par We discussed trial of glucosaine/chondroitin supplementation for 6 weeks.  Appropriate use of medication was reviewed with the patient in detail. Risks, benefits, and adverse effects medication were discussed.\par \par HA currently not covered by insurance.  We also discussed potential for PRP injections and enrollment in PRP study at [facility].  She would like to proceed with CSI, and re-consider PRP in 90 days.\par \par The patient was also provided some general home exercises.  The patient was counseled on activity modification.\par \par Follow-up in 3 months.\par \par Patient appreciates and agrees with current plan.\par \par This note was generated using dragon medical dictation software.  A reasonable effort has been made for proofreading its contents, but typos may still remain.  If there are any questions or points of clarification needed please notify my office.\par \par >15 minutes of time was spent in total for the encounter.  >50% of the time spent was on counseling/coordination of care and medical-decision making for this patient.\par \par

## 2020-12-30 NOTE — PROCEDURE
[de-identified] : Ultrasound-Guided BILATERAL Knee Injection\par \par Indication for U/S Guidance: Ensure placement within the tibiofemoral joint for diagnostic purposes, while avoiding neurovascular structures\par \par Indication for Injection: KNEE OSTEARTHRITIS\par \par A discussion was had with the patient regarding this procedure and all questions were answered. All risks, benefits and alternatives were discussed. These included but were not limited to bleeding, infection, and allergic reaction. A timeout was done to ensure correct side and pt agreed to the procedure. Betadine was used to sterilize and prep the area, and alcohol was used to clean the skin in the anterior aspect of the knee joints. The suprapatellar space was visualized utilizing the Sonosite, linear transducer. The joint was visualized in the short axis and an in-plane approach was used for the injection. Ultrasound guidance was utilized to ensure accuracy of the intra-articular injection and avoid the neurovascular structures. A 25-gauge 1.5" needle was used to inject 2cc of 1% lidocaine without epi into the SubQ.  This was followed by injection with 2cc of lidocaine 1% without epinephrine and 1cc of KENALOG. A sterile bandage was then applied. The patient tolerated the procedure well and there were no complications.  The procedure was repeated for the contralateral knee.\par \par

## 2020-12-30 NOTE — HISTORY OF PRESENT ILLNESS
[de-identified] : The patient is a 49 year old woman who presents for follow-up for knee pain.\par \par The patient was last seen about 5 months ago for persistent right calf pain, which had been ongoing for 5-6 months.  The pain had been having rest pain which was worsened with activity.  She had a lower extremity duplex which was negative for DVT.  The patient had an evaluation with neurology and vascular surgery and it was felt that her pain was likely musculoskeletal in nature.  At her last visit, we discussed a symptomatic baker's cyst as a possible cause for her pain, and after discussion, she agreed to ultrasound-guided baker's cyst aspiration and cortisone injection.  She was instructed to continue PT.  She states that her pain is significantly improved, and PT has been helpful.\par \par At her last visit, she complained of acute on chronic, atraumatic, midline low back pain in the setting of lumbar DDD.  Her pain was attributed to lumbar strain and she was prescribed NSAIDs and a home exercise program.  Her pain has resolved.\par \par She complains of chronic bilateral anteromedial knee pain, with associated crepitus and stiffness.  Pain is similar to prior episodes.  She has known knee osteoarthritis.  We discussed HA injections in the past, but they were not covered by insurance.

## 2021-01-06 ENCOUNTER — APPOINTMENT (OUTPATIENT)
Dept: ENDOCRINOLOGY | Facility: CLINIC | Age: 51
End: 2021-01-06
Payer: MEDICARE

## 2021-01-06 VITALS
SYSTOLIC BLOOD PRESSURE: 115 MMHG | HEART RATE: 64 BPM | DIASTOLIC BLOOD PRESSURE: 71 MMHG | BODY MASS INDEX: 23.94 KG/M2 | WEIGHT: 171 LBS | HEIGHT: 71 IN

## 2021-01-06 PROCEDURE — 97802 MEDICAL NUTRITION INDIV IN: CPT

## 2021-01-06 PROCEDURE — 99072 ADDL SUPL MATRL&STAF TM PHE: CPT

## 2021-01-06 PROCEDURE — 99214 OFFICE O/P EST MOD 30 MIN: CPT

## 2021-01-06 RX ORDER — ACETAMINOPHEN AND CODEINE 300; 30 MG/1; MG/1
300-30 TABLET ORAL 4 TIMES DAILY
Qty: 20 | Refills: 0 | Status: DISCONTINUED | COMMUNITY
Start: 2020-12-06 | End: 2021-01-06

## 2021-01-06 RX ORDER — LIRAGLUTIDE 6 MG/ML
18 INJECTION, SOLUTION SUBCUTANEOUS
Qty: 3 | Refills: 0 | Status: COMPLETED | OUTPATIENT
Start: 2021-01-06 | End: 2021-01-09

## 2021-01-06 RX ORDER — DICLOFENAC SODIUM 50 MG/1
50 TABLET, DELAYED RELEASE ORAL
Qty: 180 | Refills: 0 | Status: DISCONTINUED | COMMUNITY
Start: 2020-08-07 | End: 2021-01-06

## 2021-01-06 NOTE — PHYSICAL EXAM
[Alert] : alert [Healthy Appearance] : healthy appearance [No Acute Distress] : no acute distress [Normal Sclera/Conjunctiva] : normal sclera/conjunctiva [No Stigmata of Cushings Syndrome] : no stigmata of Cushings Syndrome [Normal Gait] : normal gait [Normal Insight/Judgement] : insight and judgment were intact [Kyphosis] : no kyphosis present [Acanthosis Nigricans] : no acanthosis nigricans [de-identified] : no moon facies, no supraclavicular fat pads

## 2021-01-06 NOTE — HISTORY OF PRESENT ILLNESS
[FreeTextEntry1] : Ms. Cui is a 50 year-old woman presenting for follow-up of hypothyroidism and elevated body mass index. I saw her for an initial visit in October 2020. \par \par Hypothyroidism.\par She was diagnosed with hypothyroidism around 2003. \par She has been on levothyroxine 75 mcg daily for the past year.\par She is taking levothyroxine in the morning, on an empty stomach, with plain water, and waiting at least 30 minutes before eating. She is taking a multivitamin and is  from levothyroxine by least 90 minutes. \par No history of radiation exposure.\par Mother with history of hypothyroidism. \par \par Elevated body mass index. Comorbidity of nonalcoholic steatohepatitis. \par Her early adult weight was 140 pounds. Her lowest weight was 123 pounds with diet and exercise. Her weight gradually fan to 180 pounds, back down to 170 pounds.\par She has been trying to eat less bagels, doughnuts, and pizza. She has been trying to walk more. She has not been able to go to the gym during the pandemic.\par She has seen a nutritionist in the past.\par She has not tried any medications for weight loss.\par 24 hour diet recall from her initial visit: breakfast, Chex cereal, multigrain Cheerios, 2% milk; lunch, none; dinner, two peanut butter and jelly sandwiches, Cheez-it crackers; snack, cookies and milk; occasional juice or soda\par Exercise: Walks; she has difficulty with motivation for exercise\par \par Interim History \par Laboratory results from last visit as below. Alkaline phosphatase elevated and recommend follow-up with primary care. Nonfasting triglycerides borderline elevated; her 10 year risk of heart disease or stroke is 0.7% and statin therapy not indicated. TSH within range on current regimen of levothyroxine. HbA1c within range. Other test results within range. \par She was diagnosed with nonalcoholic steatohepatitis.\par She had hand surgery.\par She met with Cara Rincon RD in October and is scheduled to see Tavia Mckeon RD today. \par She had a craving for chocolate chip cookies yesterday and had eight. \par Weight is up 1 pound since last visit. She has a history of hysterectomy and is on estradiol 1 mg daily per gynecology. She is following with urology for polyuria. She has constipation, depression/anxiety, sleep disturbance; discussed with her other treating physicians.\par Medical and surgical history, medications, allergies, social and family history reviewed and updated as needed.

## 2021-01-06 NOTE — ASSESSMENT
[FreeTextEntry1] : Hypothyroidism. She has been clinically euthyroid on her current regimen of levothyroxine other than weight gain. We reviewed proper use and compliance with levothyroxine. \par Continue levothyroxine 75 mcg daily \par \par Elevated body mass index. Comorbidity of nonalcoholic steatohepatitis. We reviewed lifestyle modifications for weight loss. We discussed follow-up with nutrition. Pharmacologic options for weight loss are limited due to concurrent medications. She is amenable to a trial of a GLP-1 receptor agonist. We discussed the risks and benefits of the GLP-1 receptor agonist class, including but not limited to nausea, pancreatitis, medullary thyroid cancer. We reviewed subcutaneous injection technique, storage, and sharps disposal. \par Lifestyle modification\par Follow-up with nutrition \par Start Saxenda 0.6 mg daily for one week, then 1.2 mg daily for one week, then 1.8 mg daily if covered by insurance (sample given)\par \par Return to see me and nutrition in 4 months.\par \par CC:\par Dr. Arpita Ron, Fax [phone]

## 2021-01-19 ENCOUNTER — APPOINTMENT (OUTPATIENT)
Dept: ORTHOPEDIC SURGERY | Facility: CLINIC | Age: 51
End: 2021-01-19
Payer: MEDICAID

## 2021-01-19 VITALS — BODY MASS INDEX: 32.28 KG/M2 | RESPIRATION RATE: 16 BRPM | HEIGHT: 61 IN | WEIGHT: 171 LBS

## 2021-01-19 PROCEDURE — 99024 POSTOP FOLLOW-UP VISIT: CPT

## 2021-03-18 ENCOUNTER — APPOINTMENT (OUTPATIENT)
Dept: ORTHOPEDIC SURGERY | Facility: CLINIC | Age: 51
End: 2021-03-18

## 2021-03-31 ENCOUNTER — APPOINTMENT (OUTPATIENT)
Dept: ORTHOPEDIC SURGERY | Facility: CLINIC | Age: 51
End: 2021-03-31
Payer: MEDICARE

## 2021-04-21 ENCOUNTER — APPOINTMENT (OUTPATIENT)
Dept: ORTHOPEDIC SURGERY | Facility: CLINIC | Age: 51
End: 2021-04-21
Payer: MEDICARE

## 2021-05-11 ENCOUNTER — APPOINTMENT (OUTPATIENT)
Dept: ORTHOPEDIC SURGERY | Facility: CLINIC | Age: 51
End: 2021-05-11
Payer: MEDICARE

## 2021-05-11 VITALS
HEART RATE: 90 BPM | WEIGHT: 171 LBS | SYSTOLIC BLOOD PRESSURE: 118 MMHG | OXYGEN SATURATION: 98 % | TEMPERATURE: 97.3 F | DIASTOLIC BLOOD PRESSURE: 60 MMHG | BODY MASS INDEX: 32.28 KG/M2 | HEIGHT: 61 IN

## 2021-05-11 DIAGNOSIS — M71.21 SYNOVIAL CYST OF POPLITEAL SPACE [BAKER], RIGHT KNEE: ICD-10-CM

## 2021-05-11 DIAGNOSIS — M17.0 BILATERAL PRIMARY OSTEOARTHRITIS OF KNEE: ICD-10-CM

## 2021-05-11 PROCEDURE — 99212 OFFICE O/P EST SF 10 MIN: CPT | Mod: 25

## 2021-05-11 PROCEDURE — 20611 DRAIN/INJ JOINT/BURSA W/US: CPT | Mod: RT

## 2021-05-11 NOTE — PROCEDURE
[de-identified] : Ultrasound-Guided RIGHT Knee BAKER'S CYST ASPIRATION\par \par Indication for U/S Guidance: Ensure placement within the POPLITEAL SPACE/SEMIMEMBRANOSUS/MEDIAL GASTROC BURSA for diagnostic purposes, while avoiding neurovascular structures\par \par Indication for Injection: BAKER'S CYST\par \par A discussion was had with the patient regarding this procedure and all questions were answered. All risks, benefits and alternatives were discussed. These included but were not limited to bleeding, infection, and allergic reaction. A timeout was done to ensure correct side and pt agreed to the procedure. Betadine was used to sterilize and prep the area, and alcohol was used to clean the skin in the posterior aspect of the knee joint. The semimembranosus/medial head gastrocnemius was identified and bursa was visualized utilizing the Sonosite, linear transducer. The baker's cyst was visualized in the long axis and an in-plane approach was used for the injection. Ultrasound guidance was utilized to ensure accuracy of the aspiration, and avoid the neurovascular structures. A 25-gauge 1.5" needle was used to inject 2cc of 1% lidocaine without epi. This was followed by aspiration with an 18-gauge 1.5" needle with aspiration of 1cc of clear, yellow, nonpurulent synovial fluid. This was followed by injection with 1cc of KENALOG. A sterile bandage was then applied. The patient tolerated the procedure well and there were no complications.

## 2021-05-11 NOTE — DISCUSSION/SUMMARY
[de-identified] : The patient is a 49 year old woman with history of lumbar degenerative disc disease, presenting with bilateral anteromedial knee pain likely secondary to knee osteoarthritis.  She has symptomatic right baker's cyst today which was aspirated/injected.\par \par The patient was counseled on the natural progression of the problem(s) today and potential complications of diagnoses.  The patient was provided reassurance today.\par \par We again discussed nonsurgical and surgical treatment options for knee OA.\par \par After informed consent, and explanation of risks, benefits, alternatives, adverse effects of injection, which includes but is not limited to infection, bleeding, allergic reaction, swelling, soft tissue weakening/tendon rupture, injection site complication, fat atrophy, skin depigmentation, failure to improve symptoms, the patient would like to proceed with the procedure - LEFT KNEE JARRELL'S CYST ULTRASOUND-GUIDED ASPIRATION/CORTICOSTEROID INJECTION. See procedure note above. Patient tolerated the procedure well. The patient was provided with postinjection instructions.\par \par Continue HEP.\par \par HA injections not covered by insurance.\par \par Follow-up in 2-3 months.\par \par ------------------------------------------------------------------------------------------------------------------\par Patient appreciates and agrees with current plan.\par \par The patient's diagnosis above was evaluated by me, personally.  Diagnostic Testing and treatment options were discussed with the patient in detail.  The risks/benefits/potential complications of diagnostic testing/treatments were described in detail.  \par \par This note was generated using a mixture of manual typing and dragon medical dictation software.  A reasonable effort has been made for proofreading its contents, but typos may still remain.  If there are any questions or points of clarification needed please notify my office.\par \par \par >15 minutes of time was spent in total for the encounter.  >50% of the time spent was on face-to-face counseling/coordination of care and medical-decision making for this patient.\par \par \par \par

## 2021-05-11 NOTE — HISTORY OF PRESENT ILLNESS
[de-identified] : The patient is a 49 year old woman who presents for follow-up for knee pain.\par \par The patient was last seen about 3 months ago for persistent right calf pain, which had been ongoing for nearly 9 months.  The pain had been having rest pain which was worsened with activity.  She had a lower extremity duplex which was negative for DVT.  The patient had an evaluation with neurology and vascular surgery and it was felt that her pain was likely musculoskeletal in nature.  \par \par We discussed a course of PT, and her symptoms had been improving.\par \par We had also been treating underlying knee OA, and she had a corticosteroid injection at her last visit.  Prior to that, she had baker's cyst aspiration/injection of right knee which she states provided the most relief.

## 2021-05-11 NOTE — PHYSICAL EXAM
[de-identified] : General: Well-nourished, well-developed, alert, and in no acute distress.\par Head: Normocephalic.\par Eyes: Pupils equal round reactive to light and accommodation, extraocular muscles intact, normal sclera.\par Nose: No nasal discharge.\par Cardiac: Regular rate. Extremities are warm and well perfused. Distal pulses are symmetric bilaterally.\par Respiratory: No labored breathing.\par Extremities: Sensation is intact distally bilaterally.  Distal pulses are symmetric bilaterally, 1+ RIGHT LOWER EXTREMITY EDEMA, PITTING\par Neurologic: No focal deficits\par Skin: Normal skin color, texture, and turgor\par Psychiatric: Normal affect\par \par RIGHT KNEE:\par \par Inspection: no bruising, swelling, erythema\par Joint Effusion:MINIMAL\par ROM: Knee Flexion 130 DEGREES , Knee Extension 0\par Palpation:MEDIAL JOINT LINE PAIN,  MINIMAL PATELLAE FACET TENDERNESS, NO PAIN AT MFC/LFC, Medial/Lateral Tibial Plateau\par Leg Length Discrepancy:no\par Patella: no apprehension\par Distal Pulses: normal\par Lower Extremity Strength:KNEE EXTENSION 5/5, KNEE FLEXION 5/5 \par Lower Extremity Reflexes:normal, 2+\par Lower Extremity Sensation: normal\par \par Special Tests:\par [facility]:Negative\par Anterior Drawer:Negative\par Posterior Drawer:Negative \par Varus/Valgus:Negative, no instability\par \par LEFT KNEE:\par \par Inspection: no bruising, swelling, erythema\par Joint Effusion:no \par ROM: Knee Flexion 130-140 , Knee Extension 0\par Palpation:MEDIAL JOINT LINE PAIN, MILD PATELLAR FACET TENDERNESS, No pain at joint line, patellar tendon, MFC/LFC, Medial/Lateral Tibial Plateau\par Leg Length Discrepancy:no\par Patella: no apprehension\par Distal Pulses: normal\par Lower Extremity Strength:normal, 5/5 \par Lower Extremity Reflexes:normal, 2+\par Lower Extremity Sensation: normal\par \par Special Tests:\par [facility]:Negative \par Anterior Drawer:Negative\par Posterior Drawer:Negative \par Varus/Valgus:Negative, no instability\par \par

## 2021-07-06 ENCOUNTER — APPOINTMENT (OUTPATIENT)
Dept: ORTHOPEDIC SURGERY | Facility: CLINIC | Age: 51
End: 2021-07-06
Payer: MEDICARE

## 2021-07-06 VITALS
TEMPERATURE: 97.6 F | WEIGHT: 171 LBS | HEART RATE: 70 BPM | SYSTOLIC BLOOD PRESSURE: 110 MMHG | HEIGHT: 61 IN | OXYGEN SATURATION: 98 % | DIASTOLIC BLOOD PRESSURE: 60 MMHG | BODY MASS INDEX: 32.28 KG/M2

## 2021-07-06 DIAGNOSIS — M17.11 UNILATERAL PRIMARY OSTEOARTHRITIS, RIGHT KNEE: ICD-10-CM

## 2021-07-06 PROCEDURE — 99212 OFFICE O/P EST SF 10 MIN: CPT | Mod: 25

## 2021-07-06 PROCEDURE — 20611 DRAIN/INJ JOINT/BURSA W/US: CPT | Mod: RT

## 2021-07-06 NOTE — PROCEDURE
[de-identified] : Ultrasound-Guided RIGHT Knee Injection\par \par Indication for U/S Guidance: Ensure placement within the tibiofemoral joint for diagnostic purposes, while avoiding neurovascular structures\par \par Indication for Injection: KNEE OSTEARTHRITIS\par \par A discussion was had with the patient regarding this procedure and all questions were answered. All risks, benefits and alternatives were discussed. These included but were not limited to bleeding, infection, and allergic reaction. A timeout was done to ensure correct side and pt agreed to the procedure. Betadine was used to sterilize and prep the area, and alcohol was used to clean the skin in the anterior aspect of the knee joints. The suprapatellar space was visualized utilizing the Sonosite, linear transducer. The joint was visualized in the short axis and an in-plane approach was used for the injection. Ultrasound guidance was utilized to ensure accuracy of the intra-articular injection and avoid the neurovascular structures. A 22-gauge 1.5" needle was used to inject 2cc of 1% lidocaine without epi into the SubQ. 10cc of nonpurulent, clear yellow synovial fluid aspiratied.  This was followed by injection with 2cc of lidocaine 1% without epinephrine, 1cc of 0.5% bupivicaine and 1cc of KENALOG. A sterile bandage was then applied. The patient tolerated the procedure well and there were no complications. The procedure was repeated for the contralateral knee.

## 2021-07-06 NOTE — DISCUSSION/SUMMARY
[Medication Risks Reviewed] : Medication risks reviewed [de-identified] : The patient is a 49 year old woman with history of lumbar degenerative disc disease, presenting with chronic, righ anteromedial knee pain likely secondary to knee osteoarthritis.  \par \par The patient was counseled on the natural progression of the problem(s) today and potential complications of diagnoses.  The patient was provided reassurance today.\par \par We again discussed nonoperative and operative treatments options for knee OA.\par \par Patient understands that they may require surgery in the future.\par \par After informed consent, and explanation of risks, benefits, alternatives, adverse effects of injection, which includes but is not limited to infection, bleeding, allergic reaction, swelling, soft tissue weakening/tendon rupture, injection site complication, fat atrophy, skin depigmentation, failure to improve symptoms, the patient would like to proceed with the procedure - RIGHT KNEE ULTRASOUND-GUIDED CORTICOSTEROID INJECTION. See procedure note above. Patient tolerated the procedure well. The patient was provided with postinjection instructions.\par \par Continue HEP.\par \par Follow-up in 3-6 months or PRN.\par \par \par ------------------------------------------------------------------------------------------------------------------\par Patient appreciates and agrees with current plan.\par \par The patient's diagnosis above was evaluated by me, personally.  Diagnostic Testing and treatment options were discussed with the patient in detail.  The risks/benefits/potential complications of diagnostic testing/treatments were described in detail.  \par \par This note was generated using a mixture of manual typing and dragon medical dictation software.  A reasonable effort has been made for proofreading its contents, but typos may still remain.  If there are any questions or points of clarification needed please notify my office.\par \par \par >15 minutes of time was spent in total for the encounter.  >50% of the time spent was on face-to-face counseling/coordination of care and medical-decision making for this patient.\par \par \par \par

## 2021-07-06 NOTE — PHYSICAL EXAM
[de-identified] : General: Well-nourished, well-developed, alert, and in no acute distress.\par Head: Normocephalic.\par Eyes: Pupils equal round reactive to light and accommodation, extraocular muscles intact, normal sclera.\par Nose: No nasal discharge.\par Cardiac: Regular rate. Extremities are warm and well perfused. Distal pulses are symmetric bilaterally.\par Respiratory: No labored breathing.\par Extremities: Sensation is intact distally bilaterally.  Distal pulses are symmetric bilaterally, 1+ RIGHT LOWER EXTREMITY EDEMA, PITTING\par Neurologic: No focal deficits\par Skin: Normal skin color, texture, and turgor\par Psychiatric: Normal affect\par \par RIGHT KNEE:\par \par Inspection: no bruising, swelling, erythema\par Joint Effusion:MINIMAL\par ROM: Knee Flexion 130 DEGREES , Knee Extension 0\par Palpation:MEDIAL JOINT LINE PAIN,  MINIMAL PATELLAE FACET TENDERNESS, NO PAIN AT MFC/LFC, Medial/Lateral Tibial Plateau\par Leg Length Discrepancy:no\par Patella: no apprehension\par Distal Pulses: normal\par Lower Extremity Strength:KNEE EXTENSION 5/5, KNEE FLEXION 5/5 \par Lower Extremity Reflexes:normal, 2+\par Lower Extremity Sensation: normal\par \par Special Tests:\par [facility]:Negative\par Anterior Drawer:Negative\par Posterior Drawer:Negative \par Varus/Valgus:Negative, no instability\par \par LEFT KNEE:\par \par Inspection: no bruising, swelling, erythema\par Joint Effusion:no \par ROM: Knee Flexion 130-140 , Knee Extension 0\par Palpation:No pain at joint line, patellar tendon, MFC/LFC, Medial/Lateral Tibial Plateau\par Leg Length Discrepancy:no\par Patella: no apprehension\par Distal Pulses: normal\par Lower Extremity Strength:normal, 5/5 \par Lower Extremity Reflexes:normal, 2+\par Lower Extremity Sensation: normal\par \par Special Tests:\par [facility]:Negative \par Anterior Drawer:Negative\par Posterior Drawer:Negative \par Varus/Valgus:Negative, no instability\par \par

## 2021-07-06 NOTE — HISTORY OF PRESENT ILLNESS
[de-identified] : The patient is a 49 year old woman who presents for follow-up for knee pain.\par \par The patient was last seen about 2 months ago for persistent right calf pain, which had been ongoing for nearly 1 year.  The pain had been having rest pain which was worsened with activity.  She had a lower extremity duplex which was negative for DVT.  The patient had an evaluation with neurology and vascular surgery and it was felt that her pain was likely musculoskeletal in nature.  \par \par We discussed a course of PT, and her symptoms have resolved.\par \par We had also been treating underlying knee OA, and she had a corticosteroid injection over 6 months ago.\par \par She has some recurrence of right knee pain and swelling.

## 2021-11-16 ENCOUNTER — APPOINTMENT (OUTPATIENT)
Dept: ENDOCRINOLOGY | Facility: CLINIC | Age: 51
End: 2021-11-16

## 2021-11-16 ENCOUNTER — APPOINTMENT (OUTPATIENT)
Dept: ENDOCRINOLOGY | Facility: CLINIC | Age: 51
End: 2021-11-16
Payer: MEDICARE

## 2021-11-16 VITALS
SYSTOLIC BLOOD PRESSURE: 125 MMHG | BODY MASS INDEX: 33.99 KG/M2 | DIASTOLIC BLOOD PRESSURE: 78 MMHG | WEIGHT: 180 LBS | HEIGHT: 61 IN | HEART RATE: 66 BPM

## 2021-11-16 PROCEDURE — 99214 OFFICE O/P EST MOD 30 MIN: CPT

## 2021-11-16 RX ORDER — PEN NEEDLE, DIABETIC 32GX 5/32"
32G X 4 MM NEEDLE, DISPOSABLE MISCELLANEOUS
Qty: 1 | Refills: 3 | Status: DISCONTINUED | COMMUNITY
Start: 2021-01-06 | End: 2021-11-16

## 2021-11-16 RX ORDER — CEFUROXIME AXETIL 500 MG/1
500 TABLET ORAL
Qty: 14 | Refills: 0 | Status: DISCONTINUED | COMMUNITY
Start: 2020-01-17 | End: 2021-11-16

## 2021-11-16 RX ORDER — ESTRADIOL 0.1 MG/G
0.1 CREAM VAGINAL
Refills: 0 | Status: DISCONTINUED | COMMUNITY
End: 2021-11-16

## 2021-11-16 RX ORDER — RISPERIDONE 0.5 MG/1
TABLET ORAL
Refills: 0 | Status: ACTIVE | COMMUNITY

## 2021-11-16 RX ORDER — MULTIVITAMIN
TABLET ORAL
Refills: 0 | Status: ACTIVE | COMMUNITY

## 2021-11-16 RX ORDER — NYSTATIN 100000 U/G
100000 OINTMENT TOPICAL
Qty: 30 | Refills: 0 | Status: DISCONTINUED | COMMUNITY
Start: 2019-08-29 | End: 2021-11-16

## 2021-11-16 RX ORDER — NYSTATIN 500MM UNIT
6.25 POWDER (EA) MISCELLANEOUS
Refills: 0 | Status: DISCONTINUED | COMMUNITY
End: 2021-11-16

## 2021-11-16 RX ORDER — LIRAGLUTIDE 6 MG/ML
18 INJECTION, SOLUTION SUBCUTANEOUS
Qty: 3 | Refills: 1 | Status: DISCONTINUED | COMMUNITY
Start: 2021-01-06 | End: 2021-11-16

## 2021-11-16 RX ORDER — PRASTERONE 6.5 MG/1
6.5 INSERT VAGINAL
Refills: 0 | Status: DISCONTINUED | COMMUNITY
End: 2021-11-16

## 2021-11-16 RX ORDER — NAPROXEN 250 MG/1
250 TABLET ORAL
Qty: 60 | Refills: 1 | Status: DISCONTINUED | COMMUNITY
Start: 2019-12-16 | End: 2021-11-16

## 2021-11-16 RX ORDER — FESOTERODINE FUMARATE 4 MG/1
4 TABLET, FILM COATED, EXTENDED RELEASE ORAL
Refills: 0 | Status: DISCONTINUED | COMMUNITY
End: 2021-11-16

## 2021-11-16 RX ORDER — GABAPENTIN 100 MG/1
100 CAPSULE ORAL
Refills: 0 | Status: DISCONTINUED | COMMUNITY
End: 2021-11-16

## 2021-11-16 RX ORDER — ERGOCALCIFEROL 1.25 MG/1
1.25 MG CAPSULE, LIQUID FILLED ORAL
Qty: 4 | Refills: 0 | Status: DISCONTINUED | COMMUNITY
Start: 2019-12-26 | End: 2021-11-16

## 2021-11-16 RX ORDER — LEVOTHYROXINE SODIUM 0.05 MG/1
50 TABLET ORAL
Refills: 0 | Status: DISCONTINUED | COMMUNITY
End: 2021-11-16

## 2021-11-16 RX ORDER — FLUVOXAMINE MALEATE 50 MG/1
50 TABLET ORAL
Qty: 30 | Refills: 0 | Status: DISCONTINUED | COMMUNITY
Start: 2020-03-30 | End: 2021-11-16

## 2021-11-17 LAB
ESTIMATED AVERAGE GLUCOSE: 108 MG/DL
HBA1C MFR BLD HPLC: 5.4 %
T4 FREE SERPL-MCNC: 1.2 NG/DL
TSH SERPL-ACNC: 1.13 UIU/ML

## 2021-11-17 NOTE — DATA REVIEWED
[FreeTextEntry1] : Laboratories (September 16, 2021) reviewed and significant for: \par Eosinophile 510 cells/mcL (normal: ), otherwise unremarkable complete blood count\par ALT 30 U/L (normal: 6-29)

## 2021-11-17 NOTE — ASSESSMENT
[FreeTextEntry1] : Hypothyroidism. She has been biochemically euthyroid on her current regimen of levothyroxine. We reviewed proper use and compliance with levothyroxine. \par Continue levothyroxine 75 mcg daily pending TSH level today\par \par Elevated body mass index. Comorbidities of osteoarthritis and nonalcoholic steatohepatitis. We reviewed lifestyle modifications for weight loss. We discussed follow-up with nutrition. Pharmacologic options for weight loss are limited due to concurrent medications. She did not tolerate Saxenda. We discussed the risks and benefits of Plenity, including but not limited to diarrhea and abdominal distention. We discussed that therapy is a $98 out of pocket cost. She will further consider. I advised she discuss her psychiatric regimen with her psychiatrist, since it may be contributing to weight gain. I advised she consider surgical options for weight loss.\par Lifestyle modification\par Follow-up with nutrition \par \par Return to see me in 3 months.\par \par CC:\par Dr. Arpita Ron, Fax [phone]

## 2021-11-17 NOTE — HISTORY OF PRESENT ILLNESS
[FreeTextEntry1] : Ms. Cui is a 51 year-old woman presenting for follow-up of hypothyroidism and elevated body mass index. I saw her for an initial visit in October 2020 and last in January 2021. \par \par Hypothyroidism.\par She was diagnosed with hypothyroidism around 2003. \par She has been on levothyroxine 75 mcg daily for the past year.\par She is taking levothyroxine in the morning, on an empty stomach, with plain water, and waiting at least 30 minutes before eating. She is taking a multivitamin and is  from levothyroxine by least 60 minutes. \par No history of radiation exposure.\par Mother with history of hypothyroidism. \par \par Elevated body mass index. Comorbidities of osteoarthritis and nonalcoholic steatohepatitis. \par Her early adult weight was 140 pounds. Her lowest weight was 123 pounds with diet and exercise. Her weight gradually fan to 180 pounds.\par She has been trying to eat less bagels, doughnuts, and pizza. She has been trying to walk more. She has not been able to go to the gym during the pandemic.\par She has seen a nutritionist in the past.\par She had not tried any medications for weight loss. We started a trial of Saxenda in January 2021; she did not tolerate.\par \par Interim History \par We started a trial of Saxenda in January 2021; she did not tolerate.\par She was started on risperidone. She continues to work with psychiatry regarding her mood symptoms.\par She had a colonoscopy last week.\par Weight is up 10 pounds since last visit. She has had constipation.\par Medical and surgical history, medications, allergies, social and family history reviewed and updated as needed.

## 2021-11-17 NOTE — PHYSICAL EXAM
[Alert] : alert [Healthy Appearance] : healthy appearance [No Acute Distress] : no acute distress [Normal Sclera/Conjunctiva] : normal sclera/conjunctiva [No Respiratory Distress] : no respiratory distress [No Stigmata of Cushings Syndrome] : no stigmata of Cushings Syndrome [Normal Gait] : normal gait [Normal Insight/Judgement] : insight and judgment were intact [Kyphosis] : no kyphosis present [Acanthosis Nigricans] : no acanthosis nigricans [de-identified] : no moon facies, no supraclavicular fat pads

## 2021-11-17 NOTE — ADDENDUM
[FreeTextEntry1] : Recent laboratory results as below; discussed with Ms. Cui. TSH within range on current regimen of levothyroxine. HbA1c within range. 11/17/21

## 2021-11-24 LAB — DHEA-SULFATE, SERUM: 92 UG/DL

## 2022-03-12 NOTE — HISTORY OF PRESENT ILLNESS
[Doing Well] : is doing well [Excellent Pain Control] : has excellent pain control [No Sign of Infection] : is showing no signs of infection [Diarrhea] : no diarrhea [de-identified] : 50 year old RHD female presenting 10 days s/p right endoscopic carpal tunnel release ([employer] Endoscopic System). The patient is doing well, no new burning pain, numbness or tingling. She reports absence of nighttime symptoms waiting her up at night. This has completely resolved since surgery. \par \par   [de-identified] : DOS: 12/7/2020  [de-identified] : Patient's incision is healing well, no erythema, drainage or signs of infection. Sutures were removed and Steri-Strips applied without issues. Patient is firing her right APB and FDI with 5/5 strength. Sensation is intact to light touch along the median, radial and ulnar nerve sensory distribution (this is subjectively symmetric to the contralateral side). Pink, warm and well-perfused fingertips. [de-identified] : I instructed the patient to not get the wound wet until the 2 week elton, this would be 21st December. I gave her permission to lift nothing heavier than a coffee cup at this time. Explained that her wound and soft tissues are still healing postoperatively and she may resume lifting heavier objects, gradually, after the one-month postop elton. The patient will plan to see me back for followup in 4 weeks.

## 2022-03-14 PROBLEM — M79.641 PAIN IN BOTH HANDS: Status: ACTIVE | Noted: 2019-08-21

## 2022-03-15 ENCOUNTER — APPOINTMENT (OUTPATIENT)
Dept: ORTHOPEDIC SURGERY | Facility: CLINIC | Age: 52
End: 2022-03-15
Payer: MEDICARE

## 2022-03-15 DIAGNOSIS — M79.641 PAIN IN RIGHT HAND: ICD-10-CM

## 2022-03-15 DIAGNOSIS — M79.642 PAIN IN RIGHT HAND: ICD-10-CM

## 2022-03-16 ENCOUNTER — APPOINTMENT (OUTPATIENT)
Dept: ENDOCRINOLOGY | Facility: CLINIC | Age: 52
End: 2022-03-16
Payer: MEDICARE

## 2022-03-16 VITALS
HEIGHT: 61 IN | HEART RATE: 80 BPM | BODY MASS INDEX: 33.42 KG/M2 | SYSTOLIC BLOOD PRESSURE: 121 MMHG | WEIGHT: 177 LBS | DIASTOLIC BLOOD PRESSURE: 77 MMHG

## 2022-03-16 PROCEDURE — 99214 OFFICE O/P EST MOD 30 MIN: CPT

## 2022-03-16 RX ORDER — CHLORHEXIDINE GLUCONATE 4 %
5 LIQUID (ML) TOPICAL
Refills: 0 | Status: DISCONTINUED | COMMUNITY
End: 2022-03-16

## 2022-03-16 NOTE — PHYSICAL EXAM
[Alert] : alert [Healthy Appearance] : healthy appearance [No Acute Distress] : no acute distress [Normal Sclera/Conjunctiva] : normal sclera/conjunctiva [No Respiratory Distress] : no respiratory distress [No Stigmata of Cushings Syndrome] : no stigmata of Cushings Syndrome [Normal Gait] : normal gait [Normal Insight/Judgement] : insight and judgment were intact [Kyphosis] : no kyphosis present [Acanthosis Nigricans] : no acanthosis nigricans [de-identified] : no moon facies, no supraclavicular fat pads

## 2022-03-16 NOTE — HISTORY OF PRESENT ILLNESS
[FreeTextEntry1] : Ms. Cui is a 51 year-old woman presenting for follow-up of hypothyroidism and elevated body mass index. I saw her for an initial visit in October 2020 and last in November 2021. \par \par Hypothyroidism.\par She was diagnosed with hypothyroidism around 2003. \par She has been on levothyroxine 75 mcg daily.\par She is taking levothyroxine in the morning, on an empty stomach, with plain water, and waiting at least 30 minutes before eating. She is taking a multivitamin and is  from levothyroxine by least 60 minutes. \par No history of radiation exposure.\par Mother with history of hypothyroidism. \par \par Elevated body mass index. Comorbidities of osteoarthritis and nonalcoholic steatohepatitis. \par Her early adult weight was 140 pounds. Her lowest weight was 123 pounds with diet and exercise. Her weight gradually fan to 180 pounds.\par She has been trying to eat less bagels, doughnuts, and pizza. She has been trying to walk more. She has not been able to go to the gym during the pandemic.\par She has seen a nutritionist in the past.\par She had not tried any medications for weight loss. We started a trial of Saxenda in January 2021; she did not tolerate.\par \par Interim History \par Laboratory results from last visit as below. TSH within range on current regimen of levothyroxine. HbA1c within range. \par She has had increased anxiety and has followed with psychiatry.\par She has followed with an outside nutritionist. \par She has obtained Plenity but has not yet started. \par She had some dizziness today, improved with water intake. \par Weight is down 3 pounds since last visit. She has had fatigue; she has had sleep disturbance due to a noisy neighbor.\par Medical and surgical history, medications, allergies, social and family history reviewed and updated as needed.

## 2022-03-16 NOTE — ASSESSMENT
[FreeTextEntry1] : Hypothyroidism. She has been biochemically euthyroid on her current regimen of levothyroxine. We reviewed proper use and compliance with levothyroxine. I advised her dose of levothyroxine may need adjustment if estradiol is adjusted or discontinued. \par Continue levothyroxine 75 mcg daily\par \par Elevated body mass index. Comorbidities of osteoarthritis and nonalcoholic steatohepatitis. We reviewed lifestyle modifications for weight loss. We discussed follow-up with nutrition. Pharmacologic options for weight loss are limited due to concurrent medications. She did not tolerate Saxenda. We discussed the risks and benefits of Plenity, including but not limited to diarrhea and abdominal distention. She has obtained Plenity and will initiate therapy soon. I advised she discuss her psychiatric regimen with her psychiatrist, since it may be contributing to weight gain. I advised she consider surgical options for weight loss.\par Lifestyle modification\par Follow-up with nutrition \par Start Plenity 2.25 g (3 capsules) twice daily (before lunch and dinner)\par \par Return to see me in 4-6 months.\par \par CC:\par Dr. Arpita Ron, Fax [phone]

## 2022-04-05 ENCOUNTER — APPOINTMENT (OUTPATIENT)
Dept: ORTHOPEDIC SURGERY | Facility: CLINIC | Age: 52
End: 2022-04-05
Payer: MEDICARE

## 2022-04-05 DIAGNOSIS — M65.342 TRIGGER FINGER, LEFT RING FINGER: ICD-10-CM

## 2022-04-05 DIAGNOSIS — M65.352 TRIGGER FINGER, LEFT LITTLE FINGER: ICD-10-CM

## 2022-04-05 PROCEDURE — 73130 X-RAY EXAM OF HAND: CPT | Mod: 50

## 2022-04-05 PROCEDURE — 99214 OFFICE O/P EST MOD 30 MIN: CPT

## 2022-07-20 ENCOUNTER — APPOINTMENT (OUTPATIENT)
Dept: ENDOCRINOLOGY | Facility: CLINIC | Age: 52
End: 2022-07-20

## 2022-07-20 VITALS
BODY MASS INDEX: 32.88 KG/M2 | HEART RATE: 78 BPM | DIASTOLIC BLOOD PRESSURE: 63 MMHG | SYSTOLIC BLOOD PRESSURE: 99 MMHG | WEIGHT: 174 LBS

## 2022-07-20 PROCEDURE — 99214 OFFICE O/P EST MOD 30 MIN: CPT

## 2022-07-20 RX ORDER — CARBOXYMETHYLCELLULOSE/CITRIC 0.75 G
CAPSULE ORAL
Refills: 0 | Status: DISCONTINUED | COMMUNITY
End: 2022-07-20

## 2022-07-20 NOTE — PHYSICAL EXAM
[Alert] : alert [Healthy Appearance] : healthy appearance [No Acute Distress] : no acute distress [Normal Sclera/Conjunctiva] : normal sclera/conjunctiva [No Respiratory Distress] : no respiratory distress [Kyphosis] : no kyphosis present [No Stigmata of Cushings Syndrome] : no stigmata of Cushings Syndrome [Normal Gait] : normal gait [Acanthosis Nigricans] : no acanthosis nigricans [Normal Insight/Judgement] : insight and judgment were intact [de-identified] : no moon facies, no supraclavicular fat pads

## 2022-07-20 NOTE — DATA REVIEWED
[FreeTextEntry1] : Laboratories (April 7, 2022) reviewed and significant for: \par Unremarkable complete blood count\par Unremarkable comprehensive metabolic panel\par HbA1c 5.3%\par TSH 1.62 uIU/mL\par LDL 86 mg/dL\par HDL 54 mg/dL\par Total cholesterol 164 mg/dL\par Triglycerides 144 mg/dL\par 25-hydroxyvitamin D 29 ng/mL

## 2022-07-20 NOTE — HISTORY OF PRESENT ILLNESS
[FreeTextEntry1] : Ms. Cui is a 51 year-old woman presenting for follow-up of hypothyroidism and elevated body mass index. I saw her for an initial visit in October 2020 and last in March 2022.\par \par Hypothyroidism.\par She was diagnosed with hypothyroidism around 2003. \par She has been on levothyroxine 75 mcg daily.\par She is taking levothyroxine in the morning, on an empty stomach, with plain water, and waiting at least 30 minutes before eating. She is taking a multivitamin and is  from levothyroxine by least 60 minutes. \par No history of radiation exposure.\par Mother with history of hypothyroidism. \par \par Elevated body mass index. Comorbidities of osteoarthritis and nonalcoholic steatohepatitis. \par Her early adult weight was 140 pounds. Her lowest weight was 123 pounds with diet and exercise. Her weight gradually fan to 180 pounds.\par She has been trying to eat less bagels, doughnuts, and pizza. She has been trying to walk more. She had not been able to go to the gym during the pandemic.\par She has been following with an outside nutritionist.\par She had not tried any medications for weight loss. We started a trial of Saxenda in January 2021; she did not tolerate. She did not tolerate Plenity.\par \par Interim History \par She did not tolerate Plenty. \par She has followed with an outside nutritionist and has made lifestyle modifications.\par She has had continued anxiety and has followed with her psychiatrist and her therapist.\par She had an episode of dizziness last week that she attributes to an ear issue; she was prescribed antibiotic ear drops by urgent care.\par Last laboratory results ordered by her primary care provider significant for the below; see scanned results. TSH within range.\par Weight is down 3 pounds since last visit; weight is overall down 6 pounds from 180 pounds. \par Medical and surgical history, medications, allergies, social and family history reviewed and updated as needed.

## 2022-07-20 NOTE — ASSESSMENT
[FreeTextEntry1] : Hypothyroidism. She has been biochemically euthyroid on her current regimen of levothyroxine. We reviewed proper use and compliance with levothyroxine. I have advised her that her dose of levothyroxine may need adjustment if estradiol is adjusted or discontinued. \par Continue levothyroxine 75 mcg daily\par \par Elevated body mass index. Comorbidities of osteoarthritis and nonalcoholic steatohepatitis. We reviewed lifestyle modifications for weight loss. We discussed follow-up with nutrition. Pharmacologic options for weight loss are limited due to concurrent medications. She did not tolerate Saxenda or Plenity. I advised she discuss her psychiatric regimen with her psychiatrist, since it may be contributing to weight gain. We can consider a trial of metformin as needed in the future. I advised she consider surgical options for weight loss.\par Lifestyle modification\par Follow-up with nutrition \par \par Return to see me in 4-6 months.\par \par CC:\par Dr. Arpita Ron, Fax [phone]

## 2022-09-30 ENCOUNTER — LABORATORY RESULT (OUTPATIENT)
Age: 52
End: 2022-09-30

## 2022-09-30 NOTE — ASU PATIENT PROFILE, ADULT - NSICDXPASTMEDICALHX_GEN_ALL_CORE_FT
PAST MEDICAL HISTORY:  Anxiety and depression     Hypothyroid     Insomnia     Overactive bladder

## 2022-09-30 NOTE — ASU PATIENT PROFILE, ADULT - ABLE TO REACH PT
left message with time and instructions/no left message with time and instructions. added on Octover2, 2022, by BERTO Maldonado Rn , patient called , , no answer , voice message left on [place]: arrival time is  06:15 am , procedure time is 08:15 am , No solids food, dairy  candy or gum, after 12Mn tonight Sunday . no smoking, drinking , please bring ID photo. insurance and vaccination card. , no jewelry,  no contact lens , Escort must be 18 years old or over, address and telephone given to patient./no

## 2022-09-30 NOTE — ASU PATIENT PROFILE, ADULT - NSICDXPASTSURGICALHX_GEN_ALL_CORE_FT
PAST SURGICAL HISTORY:  Elective surgery Placement of Intrastem in bladder    History of cholecystectomy     S/P total hysterectomy

## 2022-10-02 ENCOUNTER — TRANSCRIPTION ENCOUNTER (OUTPATIENT)
Age: 52
End: 2022-10-02

## 2022-10-03 ENCOUNTER — TRANSCRIPTION ENCOUNTER (OUTPATIENT)
Age: 52
End: 2022-10-03

## 2022-10-03 ENCOUNTER — OUTPATIENT (OUTPATIENT)
Dept: OUTPATIENT SERVICES | Facility: HOSPITAL | Age: 52
LOS: 1 days | Discharge: HOME | End: 2022-10-03

## 2022-10-03 ENCOUNTER — APPOINTMENT (OUTPATIENT)
Dept: ORTHOPEDIC SURGERY | Facility: AMBULATORY SURGERY CENTER | Age: 52
End: 2022-10-03

## 2022-10-03 VITALS
HEART RATE: 56 BPM | TEMPERATURE: 97 F | OXYGEN SATURATION: 98 % | HEIGHT: 61 IN | RESPIRATION RATE: 16 BRPM | WEIGHT: 175.93 LBS | SYSTOLIC BLOOD PRESSURE: 116 MMHG | DIASTOLIC BLOOD PRESSURE: 62 MMHG

## 2022-10-03 VITALS
SYSTOLIC BLOOD PRESSURE: 112 MMHG | OXYGEN SATURATION: 98 % | TEMPERATURE: 98 F | HEART RATE: 66 BPM | DIASTOLIC BLOOD PRESSURE: 63 MMHG | RESPIRATION RATE: 16 BRPM

## 2022-10-03 DIAGNOSIS — Z41.9 ENCOUNTER FOR PROCEDURE FOR PURPOSES OTHER THAN REMEDYING HEALTH STATE, UNSPECIFIED: Chronic | ICD-10-CM

## 2022-10-03 DIAGNOSIS — Z90.710 ACQUIRED ABSENCE OF BOTH CERVIX AND UTERUS: Chronic | ICD-10-CM

## 2022-10-03 DIAGNOSIS — Z90.49 ACQUIRED ABSENCE OF OTHER SPECIFIED PARTS OF DIGESTIVE TRACT: Chronic | ICD-10-CM

## 2022-10-03 PROCEDURE — 29848 WRIST ENDOSCOPY/SURGERY: CPT | Mod: LT

## 2022-10-03 RX ORDER — ACETAMINOPHEN 500 MG
650 TABLET ORAL ONCE
Refills: 0 | Status: DISCONTINUED | OUTPATIENT
Start: 2022-10-03 | End: 2022-10-03

## 2022-10-03 RX ORDER — LEVOTHYROXINE SODIUM 125 MCG
1 TABLET ORAL
Qty: 0 | Refills: 0 | DISCHARGE

## 2022-10-03 RX ORDER — RISPERIDONE 4 MG/1
0 TABLET ORAL
Qty: 0 | Refills: 0 | DISCHARGE

## 2022-10-03 RX ORDER — LANOLIN ALCOHOL/MO/W.PET/CERES
1 CREAM (GRAM) TOPICAL
Qty: 0 | Refills: 0 | DISCHARGE

## 2022-10-03 RX ORDER — ONDANSETRON 8 MG/1
4 TABLET, FILM COATED ORAL ONCE
Refills: 0 | Status: DISCONTINUED | OUTPATIENT
Start: 2022-10-03 | End: 2022-10-03

## 2022-10-03 RX ORDER — ACETAMINOPHEN 500 MG
650 TABLET ORAL
Qty: 0 | Refills: 0 | DISCHARGE

## 2022-10-03 RX ORDER — FLUOXETINE HCL 10 MG
200 CAPSULE ORAL
Qty: 0 | Refills: 0 | DISCHARGE

## 2022-10-03 NOTE — BRIEF OPERATIVE NOTE - NSICDXBRIEFPROCEDURE_GEN_ALL_CORE_FT
PROCEDURES:  Endoscopic carpal tunnel release of left wrist 03-Oct-2022 09:25:49  Krzysztof Carrasco

## 2022-10-03 NOTE — ASU DISCHARGE PLAN (ADULT/PEDIATRIC) - ASU DC SPECIAL INSTRUCTIONSFT
Keep dressing clean, dry & intact  Use arm elevator pillow to elevate hand and reduce pain and swelling  Finger ROM is encouraged to reduce stiffness

## 2022-10-03 NOTE — ASU DISCHARGE PLAN (ADULT/PEDIATRIC) - CARE PROVIDER_API CALL
Kiet Owens)  [facility]  [address] [place] [address]  Phone: [phone]  Fax: [phone]  Established Patient  Follow Up Time:

## 2022-10-03 NOTE — PRE-ANESTHESIA EVALUATION ADULT - NSANTHADDINFOFT_GEN_ALL_CORE
Anesthetic plan was thoroughly discussed including the risk, benefit, alternatives and potential complications such as minor injuries to airway/teeth/lips, nerve damage, aspiration, hemodynamic instability and organ dysfunction related to the underlying pathology and the planned procedure. All questions were answered and the patient  wants to proceed with the MAC plan. Consent in chart.

## 2022-10-03 NOTE — ASU DISCHARGE PLAN (ADULT/PEDIATRIC) - NS MD DC FALL RISK RISK
For information on Fall & Injury Prevention, visit: [email or web address] OR  [email or web address] OR  https://www.cdc.gov/steadi/patient.html

## 2022-10-13 ENCOUNTER — APPOINTMENT (OUTPATIENT)
Dept: ORTHOPEDIC SURGERY | Facility: CLINIC | Age: 52
End: 2022-10-13

## 2022-10-13 DIAGNOSIS — G56.02 CARPAL TUNNEL SYNDROME, LEFT UPPER LIMB: ICD-10-CM

## 2022-10-13 PROBLEM — N32.81 OVERACTIVE BLADDER: Chronic | Status: ACTIVE | Noted: 2022-10-03

## 2022-10-13 PROBLEM — F41.9 ANXIETY DISORDER, UNSPECIFIED: Chronic | Status: ACTIVE | Noted: 2022-10-03

## 2022-10-13 PROBLEM — E03.9 HYPOTHYROIDISM, UNSPECIFIED: Chronic | Status: ACTIVE | Noted: 2022-10-03

## 2022-10-13 PROBLEM — G47.00 INSOMNIA, UNSPECIFIED: Chronic | Status: ACTIVE | Noted: 2022-10-03

## 2022-10-13 PROCEDURE — 99024 POSTOP FOLLOW-UP VISIT: CPT

## 2022-11-30 PROBLEM — G56.03 BILATERAL CARPAL TUNNEL SYNDROME: Status: ACTIVE | Noted: 2019-05-30

## 2022-12-01 ENCOUNTER — APPOINTMENT (OUTPATIENT)
Dept: ORTHOPEDIC SURGERY | Facility: CLINIC | Age: 52
End: 2022-12-01

## 2022-12-01 DIAGNOSIS — G56.03 CARPAL TUNNEL SYNDROM,BILATERAL UPPER LIMBS: ICD-10-CM

## 2022-12-01 PROCEDURE — 99024 POSTOP FOLLOW-UP VISIT: CPT

## 2022-12-07 ENCOUNTER — LABORATORY RESULT (OUTPATIENT)
Age: 52
End: 2022-12-07

## 2022-12-07 ENCOUNTER — APPOINTMENT (OUTPATIENT)
Dept: ENDOCRINOLOGY | Facility: CLINIC | Age: 52
End: 2022-12-07

## 2022-12-07 VITALS
DIASTOLIC BLOOD PRESSURE: 58 MMHG | SYSTOLIC BLOOD PRESSURE: 97 MMHG | HEART RATE: 73 BPM | BODY MASS INDEX: 30.61 KG/M2 | WEIGHT: 162 LBS

## 2022-12-07 PROCEDURE — 99214 OFFICE O/P EST MOD 30 MIN: CPT

## 2022-12-08 LAB — TSH SERPL-ACNC: 0.21 UIU/ML

## 2022-12-08 NOTE — ASSESSMENT
[FreeTextEntry1] : Hypothyroidism. She has been biochemically euthyroid on her current regimen of levothyroxine. We reviewed proper use and compliance with levothyroxine. I have advised her that her dose of levothyroxine may need adjustment if estradiol is adjusted or discontinued. We will repeat her TSH today after a recent diagnosis of colitis and weight loss. \par Continue levothyroxine 75 mcg daily pending level\par \par Elevated body mass index. Comorbidities of osteoarthritis and nonalcoholic steatohepatitis. We reviewed lifestyle modifications for weight loss. We discussed follow-up with nutrition. Pharmacologic options for weight loss are limited due to concurrent medications. She did not tolerate Saxenda or Plenity. I advised she discuss her psychiatric regimen with her psychiatrist, since it may be contributing to weight gain. We can consider a trial of metformin or Wegovy as needed in the future; Mounjaro may be approved for weight loss in the new year. We have discussed surgical options for weight loss.\par Lifestyle modification\par Follow-up with nutrition \par \par Return to see me in 6 months.\par \par CC:\par Dr. Arpita Ron, Fax [phone]

## 2022-12-08 NOTE — HISTORY OF PRESENT ILLNESS
[FreeTextEntry1] : Ms. Cui is a 52 year-old woman presenting for follow-up of hypothyroidism and elevated body mass index. I saw her for an initial visit in October 2020 and last in July 2022.\par \par Hypothyroidism.\par She was diagnosed with hypothyroidism around 2003. \par She has been on levothyroxine 75 mcg daily.\par She is taking levothyroxine in the morning, on an empty stomach, with plain water, and waiting at least 30 minutes before eating. She is taking a multivitamin and is  from levothyroxine by least 60 minutes. \par No history of radiation exposure.\par Mother with history of hypothyroidism. \par \par Elevated body mass index. Comorbidities of osteoarthritis and nonalcoholic steatohepatitis. \par Her early adult weight was 140 pounds. Her lowest weight was 123 pounds with diet and exercise. Her weight gradually fan to 180 pounds.\par She has been trying to eat less bagels, doughnuts, and pizza. She has been trying to walk more. She had not been able to go to the gym during the pandemic.\par She has been following with an outside nutritionist.\par She had not tried any medications for weight loss. She did not tolerate Saxenda. She did not tolerate Plenity.\par \par Interim History \par She was noted to have colitis and E. coli infection. She has received antibiotic therapy.\par She has not followed with nutrition due to her illness.\par Her appetite has been lower. She has decreased portion sizes. \par She has had continued anxiety and has followed with her psychiatrist and her therapist. Her dose of risperidone was increased.\par Weight is down 12 pounds since last visit; weight is overall down 18 pounds from 180 pounds. Bowel movements have been regular, but she does not feel back to baseline. \par Medical and surgical history, medications, allergies, social and family history reviewed and updated as needed.

## 2022-12-08 NOTE — ADDENDUM
[FreeTextEntry1] : Recent laboratory results as below; discussed with Ms. Cui. TSH below goal and recommended adjustment in levothyroxine from 75 mcg daily to 75 mcg 6 days/week (average daily dose 64 mcg). She will be seeing her primary care provider in January and I advised repeat TSH testing at that time. 12/08/22

## 2022-12-08 NOTE — PHYSICAL EXAM
[Alert] : alert [Healthy Appearance] : healthy appearance [No Acute Distress] : no acute distress [Normal Sclera/Conjunctiva] : normal sclera/conjunctiva [No Respiratory Distress] : no respiratory distress [No Stigmata of Cushings Syndrome] : no stigmata of Cushings Syndrome [Normal Gait] : normal gait [Normal Insight/Judgement] : insight and judgment were intact [Kyphosis] : no kyphosis present [Acanthosis Nigricans] : no acanthosis nigricans [de-identified] : no moon facies, no supraclavicular fat pads

## 2023-03-07 NOTE — PHYSICAL EXAM
[de-identified] : general: well appearing in NAD\par \par R hand \par minimal muscle wasting at thenar eminence \par no erythema, wounds, abrasions \par + durken's compression test, positive tinnel's \par SILT over m/u/r nerve distributions \par cap refill < 2 seconds, hand wwp \par motor intact of AIN/PIN/U nerves \par \par L hand \par no muscle wasting prsent  \par no erythema, wounds, abrasions \par + durken's compression test, positive tinnel's \par SILT over m/u/r nerve distributions \par cap refill < 2 seconds, hand wwp \par motor intact of AIN/PIN/U nerves  27.9

## 2023-11-01 ENCOUNTER — NON-APPOINTMENT (OUTPATIENT)
Age: 53
End: 2023-11-01

## 2023-11-01 ENCOUNTER — APPOINTMENT (OUTPATIENT)
Dept: ENDOCRINOLOGY | Facility: CLINIC | Age: 53
End: 2023-11-01
Payer: MEDICARE

## 2023-11-01 VITALS
HEIGHT: 61 IN | DIASTOLIC BLOOD PRESSURE: 68 MMHG | WEIGHT: 131 LBS | BODY MASS INDEX: 24.73 KG/M2 | HEART RATE: 72 BPM | SYSTOLIC BLOOD PRESSURE: 109 MMHG

## 2023-11-01 DIAGNOSIS — E03.9 HYPOTHYROIDISM, UNSPECIFIED: ICD-10-CM

## 2023-11-01 DIAGNOSIS — E66.9 OBESITY, UNSPECIFIED: ICD-10-CM

## 2023-11-01 PROCEDURE — 99214 OFFICE O/P EST MOD 30 MIN: CPT

## 2023-11-01 RX ORDER — HYALURONATE SODIUM 20 MG/2 ML
20 SYRINGE (ML) INTRAARTICULAR
Qty: 1 | Refills: 0 | Status: DISCONTINUED | COMMUNITY
Start: 2020-01-09 | End: 2023-11-01

## 2023-11-01 RX ORDER — MAGNESIUM OXIDE/MAG AA CHELATE 300 MG
CAPSULE ORAL
Refills: 0 | Status: DISCONTINUED | COMMUNITY
End: 2023-11-01

## 2023-11-01 RX ORDER — CHLORHEXIDINE GLUCONATE 4 %
LIQUID (ML) TOPICAL
Refills: 0 | Status: ACTIVE | COMMUNITY

## 2023-11-01 RX ORDER — UBIQUINOL 100 MG
CAPSULE ORAL
Refills: 0 | Status: DISCONTINUED | COMMUNITY
End: 2023-11-01

## 2023-11-01 RX ORDER — HYDROCORTISONE 10 MG/G
1 CREAM TOPICAL
Qty: 56 | Refills: 0 | Status: DISCONTINUED | COMMUNITY
Start: 2020-02-28 | End: 2023-11-01

## 2023-11-01 RX ORDER — CHOLECALCIFEROL (VITAMIN D3) 125 MCG
TABLET ORAL
Refills: 0 | Status: DISCONTINUED | COMMUNITY
End: 2023-11-01

## 2023-11-01 RX ORDER — ACETAMINOPHEN AND CODEINE 300; 30 MG/1; MG/1
300-30 TABLET ORAL
Qty: 20 | Refills: 0 | Status: DISCONTINUED | COMMUNITY
Start: 2022-09-29 | End: 2023-11-01

## 2023-11-02 LAB — TSH SERPL-ACNC: 1.15 UIU/ML

## 2023-11-02 RX ORDER — LEVOTHYROXINE SODIUM 0.07 MG/1
75 TABLET ORAL
Qty: 90 | Refills: 3 | Status: ACTIVE | COMMUNITY
Start: 2020-03-04 | End: 1900-01-01

## 2024-11-05 ENCOUNTER — APPOINTMENT (OUTPATIENT)
Dept: ENDOCRINOLOGY | Facility: CLINIC | Age: 54
End: 2024-11-05
Payer: MEDICARE

## 2024-11-05 VITALS
HEART RATE: 61 BPM | DIASTOLIC BLOOD PRESSURE: 65 MMHG | WEIGHT: 124 LBS | BODY MASS INDEX: 23.41 KG/M2 | HEIGHT: 61 IN | SYSTOLIC BLOOD PRESSURE: 101 MMHG

## 2024-11-05 DIAGNOSIS — E03.9 HYPOTHYROIDISM, UNSPECIFIED: ICD-10-CM

## 2024-11-05 PROCEDURE — G2211 COMPLEX E/M VISIT ADD ON: CPT

## 2024-11-05 PROCEDURE — 99214 OFFICE O/P EST MOD 30 MIN: CPT

## 2024-11-05 RX ORDER — FENOFIBRATE 145 MG/1
TABLET ORAL
Refills: 0 | Status: ACTIVE | COMMUNITY

## 2024-11-06 LAB
T3 SERPL-MCNC: 92 NG/DL
T4 FREE SERPL-MCNC: 1.3 NG/DL
T4 SERPL-MCNC: 10.9 UG/DL
TSH SERPL-ACNC: 1.91 UIU/ML

## (undated) DEVICE — SUT ETHILON 5-0 18" P-3

## (undated) DEVICE — PACK HAND

## (undated) DEVICE — GLV 8.5 PROTEXIS (WHITE)

## (undated) DEVICE — GLV 7.5 PROTEXIS (WHITE)

## (undated) DEVICE — SYS ENDO STRATOS RELEASE 30 DEG 4MM

## (undated) DEVICE — GLV 8 PROTEXIS (WHITE)

## (undated) DEVICE — TOURNIQUET CUFF 24" DUAL PORT SINGLE BLADDER W PLC (BLACK)

## (undated) DEVICE — TOURNIQUET CUFF 18" DUAL PORT SINGLE BLADDER W PLC  (BLACK)